# Patient Record
Sex: MALE | Race: WHITE | NOT HISPANIC OR LATINO | Employment: OTHER | URBAN - METROPOLITAN AREA
[De-identification: names, ages, dates, MRNs, and addresses within clinical notes are randomized per-mention and may not be internally consistent; named-entity substitution may affect disease eponyms.]

---

## 2017-01-12 ENCOUNTER — PATIENT MESSAGE (OUTPATIENT)
Dept: ORTHOPEDICS | Facility: CLINIC | Age: 72
End: 2017-01-12

## 2017-02-16 ENCOUNTER — PATIENT MESSAGE (OUTPATIENT)
Dept: ORTHOPEDICS | Facility: CLINIC | Age: 72
End: 2017-02-16

## 2017-02-19 RX ORDER — CEFADROXIL 500 MG/1
CAPSULE ORAL
Qty: 5 CAPSULE | Refills: 0 | Status: SHIPPED | OUTPATIENT
Start: 2017-02-19 | End: 2017-08-20 | Stop reason: SDUPTHER

## 2017-04-19 ENCOUNTER — PATIENT MESSAGE (OUTPATIENT)
Dept: ORTHOPEDICS | Facility: CLINIC | Age: 72
End: 2017-04-19

## 2017-05-02 ENCOUNTER — OFFICE VISIT (OUTPATIENT)
Dept: ORTHOPEDICS | Facility: CLINIC | Age: 72
End: 2017-05-02
Payer: MEDICARE

## 2017-05-02 VITALS
DIASTOLIC BLOOD PRESSURE: 87 MMHG | BODY MASS INDEX: 24.03 KG/M2 | HEART RATE: 65 BPM | WEIGHT: 162.25 LBS | HEIGHT: 69 IN | SYSTOLIC BLOOD PRESSURE: 139 MMHG

## 2017-05-02 DIAGNOSIS — M71.9 DISORDERS OF BURSAE AND TENDONS IN SHOULDER REGION, UNSPECIFIED: Primary | ICD-10-CM

## 2017-05-02 DIAGNOSIS — M75.42 SHOULDER IMPINGEMENT SYNDROME, LEFT: ICD-10-CM

## 2017-05-02 DIAGNOSIS — M75.41 SHOULDER IMPINGEMENT SYNDROME, RIGHT: ICD-10-CM

## 2017-05-02 DIAGNOSIS — M67.919 DISORDERS OF BURSAE AND TENDONS IN SHOULDER REGION, UNSPECIFIED: Primary | ICD-10-CM

## 2017-05-02 PROCEDURE — 99213 OFFICE O/P EST LOW 20 MIN: CPT | Mod: PBBFAC,25 | Performed by: PHYSICIAN ASSISTANT

## 2017-05-02 PROCEDURE — 20610 DRAIN/INJ JOINT/BURSA W/O US: CPT | Mod: 50,PBBFAC | Performed by: PHYSICIAN ASSISTANT

## 2017-05-02 PROCEDURE — 96372 THER/PROPH/DIAG INJ SC/IM: CPT | Mod: PBBFAC | Performed by: PHYSICIAN ASSISTANT

## 2017-05-02 PROCEDURE — 99214 OFFICE O/P EST MOD 30 MIN: CPT | Mod: 25,S$PBB,, | Performed by: PHYSICIAN ASSISTANT

## 2017-05-02 PROCEDURE — 99999 PR PBB SHADOW E&M-EST. PATIENT-LVL III: CPT | Mod: PBBFAC,,, | Performed by: PHYSICIAN ASSISTANT

## 2017-05-02 PROCEDURE — 20610 DRAIN/INJ JOINT/BURSA W/O US: CPT | Mod: 50,S$PBB,, | Performed by: PHYSICIAN ASSISTANT

## 2017-05-02 RX ORDER — METHYLPREDNISOLONE ACETATE 80 MG/ML
40 INJECTION, SUSPENSION INTRA-ARTICULAR; INTRALESIONAL; INTRAMUSCULAR; SOFT TISSUE
Status: COMPLETED | OUTPATIENT
Start: 2017-05-02 | End: 2017-05-02

## 2017-05-02 RX ORDER — PIROXICAM 20 MG/1
CAPSULE ORAL
Refills: 3 | COMMUNITY
Start: 2017-02-07 | End: 2017-09-07 | Stop reason: ALTCHOICE

## 2017-05-02 RX ADMIN — METHYLPREDNISOLONE ACETATE 40 MG: 80 INJECTION, SUSPENSION INTRALESIONAL; INTRAMUSCULAR; INTRASYNOVIAL; SOFT TISSUE at 10:05

## 2017-05-02 RX ADMIN — METHYLPREDNISOLONE ACETATE 40 MG: 80 INJECTION, SUSPENSION INTRA-ARTICULAR; INTRALESIONAL; INTRAMUSCULAR; SOFT TISSUE at 10:05

## 2017-05-02 NOTE — PROGRESS NOTES
Subjective:      Patient ID: Trip Gomes is a 71 y.o. male.    Chief Complaint: Pain of the Left Shoulder and Pain of the Right Shoulder    HPI Comments: Body part: Bilat Shoulders    Occupation: retired     Dominant hand: R    Referred by: No ref. provider found    Date of Injury: None    Patient's visit goal: Injection to shoulders    Problem Description: Pt is known to the dept, new to me for bilat shoulder pain. He has had bilat MRI and has degenerative changes. Has had several months of relief with prior injection into each shoulder. He is active and works out several days/ week. He has no weakness, cervicalgia, or skin sen disturbance. He has night pain that is positional. He does have relief with nsaids if taken. Would like repeat injection.           Pain   This is a chronic problem. The current episode started more than 1 year ago. The problem occurs daily. The problem has been unchanged. Exacerbated by: lying on one side at night. He has tried heat, ice and NSAIDs for the symptoms. The treatment provided mild relief.       Review of Systems   Musculoskeletal: Positive for arthritis and joint pain.         Objective:            General    Nursing note and vitals reviewed.  Constitutional: He is oriented to person, place, and time. He appears well-developed and well-nourished. No distress.   Neurological: He is alert and oriented to person, place, and time.   Psychiatric: He has a normal mood and affect. His behavior is normal. Judgment and thought content normal.         Back (L-Spine & T-Spine) / Neck (C-Spine) Exam     Tenderness   The patient is tender to palpation of the right trapezial and left trapezial.   Right Shoulder Exam     Inspection/Observation   Swelling: absent  Bruising: absent  Scars: absent    Range of Motion   Active Abduction: normal   Forward Elevation: normal  Adduction: normal  External Rotation 90 degrees: normal  Internal Rotation 90 degrees: normal     Tests  & Signs   Impingement: positive    Other   Sensation: normal    Comments:  Bilat prominent AC joint    Left Shoulder Exam     Inspection/Observation   Swelling: absent  Bruising: absent  Scars: absent    Range of Motion   Active Abduction: normal   Forward Elevation: normal  Adduction: normal  External Rotation 90 degrees: normal  Internal Rotation 90 degrees: normal     Tests & Signs   Impingement: positive    Other   Sensation: normal       Muscle Strength   Right Upper Extremity   Shoulder Abduction: 5/5   Shoulder Internal Rotation: 5/5   Shoulder External Rotation: 5/5 (-)   Left Upper Extremity  Shoulder Abduction: 5/5   Shoulder Internal Rotation: 5/5   Shoulder External Rotation: 5/5 (-)     Reflexes     Left Side  Left Hayes's Sign:  Absent    Right Side   Right Hayes's Sign:  absent    Vascular Exam       Capillary Refill  Right Hand: normal capillary refill  Left Hand: normal capillary refill              Assessment:       Encounter Diagnoses   Name Primary?    Disorders of bursae and tendons in shoulder region, unspecified Yes    Shoulder impingement syndrome, left     Shoulder impingement syndrome, right           Plan:       Trip was seen today for pain and pain.    Diagnoses and all orders for this visit:    Disorders of bursae and tendons in shoulder region, unspecified  -     methylPREDNISolone acetate injection 40 mg; Inject 0.5 mLs (40 mg total) into the articular space one time.  -     methylPREDNISolone acetate injection 40 mg; Inject 0.5 mLs (40 mg total) into the articular space one time.    Shoulder impingement syndrome, left  -     methylPREDNISolone acetate injection 40 mg; Inject 0.5 mLs (40 mg total) into the articular space one time.  -     methylPREDNISolone acetate injection 40 mg; Inject 0.5 mLs (40 mg total) into the articular space one time.    Shoulder impingement syndrome, right  -     methylPREDNISolone acetate injection 40 mg; Inject 0.5 mLs (40 mg total) into the  articular space one time.  -     methylPREDNISolone acetate injection 40 mg; Inject 0.5 mLs (40 mg total) into the articular space one time.    Proceed with bilat shoulder injection as case appears to be more bursal with night pain. He is quite functional. Discussed receiving as few injections as possible. He understands the recommendations.     Discussed conservative vs surgical care.   Agrees to move forward with shoulder injection. Discussed skin breakdown, infection, and bleaching. Still agrees to move forward. Pt will return if condition returns.     Inspection of the R shoulder showed normal alignment with intact skin and no visible lesions. The subacromial space was marked and cleansed 3 circular swabs of iodine scrub followed by a swipe of alcohol pad. Using a 27g needle, 2cc 1% xylocaine and 0.5cc 80mg depo-medol  was injected into the subacromial space without complication. Needle was removed and hemostasis was achieved. Patient reported immediate relief. Same procedure repeated on the Left.     The patient understands, chooses and consents to this plan and accepts all   the risks which include but are not limited to the risks mentioned above.   Pt understands the alternative of having no testing, intervention or       treatment at this time. Pt left content and without questions.     Disclaimer: This note was prepared using a voice recognition system and is likely to have sound alike errors within the text.

## 2017-08-17 ENCOUNTER — PATIENT MESSAGE (OUTPATIENT)
Dept: ORTHOPEDICS | Facility: CLINIC | Age: 72
End: 2017-08-17

## 2017-08-20 RX ORDER — CEFADROXIL 500 MG/1
CAPSULE ORAL
Qty: 5 CAPSULE | Refills: 0 | Status: SHIPPED | OUTPATIENT
Start: 2017-08-20 | End: 2017-11-22 | Stop reason: SDUPTHER

## 2017-08-21 NOTE — TELEPHONE ENCOUNTER
Please let the patient know that their medication refill was e- scribed in.    Thank you,    Dr. Triplett

## 2017-09-07 ENCOUNTER — OFFICE VISIT (OUTPATIENT)
Dept: INTERNAL MEDICINE | Facility: CLINIC | Age: 72
End: 2017-09-07
Payer: MEDICARE

## 2017-09-07 ENCOUNTER — HOSPITAL ENCOUNTER (OUTPATIENT)
Dept: RADIOLOGY | Facility: HOSPITAL | Age: 72
Discharge: HOME | End: 2017-09-07
Attending: FAMILY MEDICINE
Payer: MEDICARE

## 2017-09-07 VITALS
HEIGHT: 69 IN | DIASTOLIC BLOOD PRESSURE: 82 MMHG | SYSTOLIC BLOOD PRESSURE: 134 MMHG | WEIGHT: 162.94 LBS | HEART RATE: 81 BPM | BODY MASS INDEX: 24.13 KG/M2 | TEMPERATURE: 97 F

## 2017-09-07 DIAGNOSIS — I10 ESSENTIAL HYPERTENSION: ICD-10-CM

## 2017-09-07 DIAGNOSIS — I10 ESSENTIAL HYPERTENSION: Primary | ICD-10-CM

## 2017-09-07 PROCEDURE — 1157F ADVNC CARE PLAN IN RCRD: CPT | Mod: ,,, | Performed by: FAMILY MEDICINE

## 2017-09-07 PROCEDURE — 1159F MED LIST DOCD IN RCRD: CPT | Mod: ,,, | Performed by: FAMILY MEDICINE

## 2017-09-07 PROCEDURE — 99213 OFFICE O/P EST LOW 20 MIN: CPT | Mod: S$PBB,,, | Performed by: FAMILY MEDICINE

## 2017-09-07 PROCEDURE — 3075F SYST BP GE 130 - 139MM HG: CPT | Mod: ,,, | Performed by: FAMILY MEDICINE

## 2017-09-07 PROCEDURE — 99999 PR PBB SHADOW E&M-EST. PATIENT-LVL III: CPT | Mod: PBBFAC,,, | Performed by: FAMILY MEDICINE

## 2017-09-07 PROCEDURE — 93880 EXTRACRANIAL BILAT STUDY: CPT | Mod: TC,PO

## 2017-09-07 PROCEDURE — 3079F DIAST BP 80-89 MM HG: CPT | Mod: ,,, | Performed by: FAMILY MEDICINE

## 2017-09-07 PROCEDURE — 93880 EXTRACRANIAL BILAT STUDY: CPT | Mod: 26,,, | Performed by: RADIOLOGY

## 2017-09-07 PROCEDURE — 99213 OFFICE O/P EST LOW 20 MIN: CPT | Mod: PBBFAC,25,PO | Performed by: FAMILY MEDICINE

## 2017-09-07 PROCEDURE — 1126F AMNT PAIN NOTED NONE PRSNT: CPT | Mod: ,,, | Performed by: FAMILY MEDICINE

## 2017-09-07 NOTE — PROGRESS NOTES
Subjective:       Patient ID: Trip Gomes is a 72 y.o. male.    Chief Complaint: Ear Fullness    Ear Fullness    There is pain in the left ear. This is a new problem. The current episode started more than 1 month ago. The problem occurs constantly. The problem has been unchanged. There has been no fever. The pain is at a severity of 0/10. The patient is experiencing no pain. Pertinent negatives include no coughing, diarrhea, ear discharge, headaches, hearing loss, neck pain, rhinorrhea or vomiting. Associated symptoms comments: Pulsating ear when stand-up. He has tried nothing for the symptoms. The treatment provided no relief. There is no history of hearing loss.     Review of Systems   Constitutional: Negative for activity change and unexpected weight change.   HENT: Negative for ear discharge, ear pain, hearing loss, rhinorrhea and trouble swallowing.    Eyes: Negative for discharge and visual disturbance.   Respiratory: Negative for cough, chest tightness and wheezing.    Cardiovascular: Negative for chest pain and palpitations.   Gastrointestinal: Negative for blood in stool, constipation, diarrhea and vomiting.   Endocrine: Negative for polydipsia and polyuria.   Genitourinary: Negative for difficulty urinating, hematuria and urgency.   Musculoskeletal: Negative for arthralgias, joint swelling and neck pain.   Neurological: Negative for weakness and headaches.   Psychiatric/Behavioral: Negative for confusion and dysphoric mood.       Objective:      Physical Exam   Constitutional: He is oriented to person, place, and time. He appears well-developed and well-nourished.   HENT:   Right Ear: Tympanic membrane normal.   Left Ear: Tympanic membrane normal.   Neck: Normal range of motion. Neck supple.   Cardiovascular: Normal rate and regular rhythm.    Pulmonary/Chest: Effort normal and breath sounds normal.   Abdominal: Soft. Bowel sounds are normal.   Neurological: He is alert and oriented to person, place,  and time.       Assessment:       1. Essential hypertension        Plan:       Essential hypertension  Comments:  Will do U/S carotid  Orders:  -     US Carotid Bilateral; Future; Expected date: 09/07/2017

## 2017-11-16 ENCOUNTER — PATIENT MESSAGE (OUTPATIENT)
Dept: ORTHOPEDICS | Facility: CLINIC | Age: 72
End: 2017-11-16

## 2017-11-22 ENCOUNTER — TELEPHONE (OUTPATIENT)
Dept: ORTHOPEDICS | Facility: CLINIC | Age: 72
End: 2017-11-22

## 2017-11-22 RX ORDER — CEFADROXIL 500 MG/1
CAPSULE ORAL
Qty: 5 CAPSULE | Refills: 3 | Status: SHIPPED | OUTPATIENT
Start: 2017-11-22 | End: 2017-12-07 | Stop reason: ALTCHOICE

## 2017-11-22 NOTE — TELEPHONE ENCOUNTER
Called pt to advise of Rx being sent to pharmacy. Advised pt to Take 1 pill the evening before dental procedure. Then take twice a day until gone. Pt vocalized understanding.

## 2017-11-27 ENCOUNTER — PATIENT OUTREACH (OUTPATIENT)
Dept: ADMINISTRATIVE | Facility: HOSPITAL | Age: 72
End: 2017-11-27

## 2017-12-07 ENCOUNTER — LAB VISIT (OUTPATIENT)
Dept: LAB | Facility: HOSPITAL | Age: 72
End: 2017-12-07
Attending: FAMILY MEDICINE
Payer: MEDICARE

## 2017-12-07 ENCOUNTER — OFFICE VISIT (OUTPATIENT)
Dept: INTERNAL MEDICINE | Facility: CLINIC | Age: 72
End: 2017-12-07
Payer: MEDICARE

## 2017-12-07 VITALS
WEIGHT: 162.94 LBS | DIASTOLIC BLOOD PRESSURE: 86 MMHG | HEIGHT: 69 IN | SYSTOLIC BLOOD PRESSURE: 144 MMHG | BODY MASS INDEX: 24.13 KG/M2 | TEMPERATURE: 96 F | HEART RATE: 69 BPM

## 2017-12-07 DIAGNOSIS — H93.8X9 SENSATION OF FULLNESS IN EAR, UNSPECIFIED LATERALITY: ICD-10-CM

## 2017-12-07 DIAGNOSIS — E78.00 HYPERCHOLESTEREMIA: ICD-10-CM

## 2017-12-07 DIAGNOSIS — Z23 IMMUNIZATION DUE: Primary | ICD-10-CM

## 2017-12-07 LAB
CHOLEST SERPL-MCNC: 257 MG/DL
CHOLEST/HDLC SERPL: 2.9 {RATIO}
HDLC SERPL-MCNC: 90 MG/DL
HDLC SERPL: 35 %
LDLC SERPL CALC-MCNC: 156.8 MG/DL
NONHDLC SERPL-MCNC: 167 MG/DL
TRIGL SERPL-MCNC: 51 MG/DL

## 2017-12-07 PROCEDURE — 99213 OFFICE O/P EST LOW 20 MIN: CPT | Mod: PBBFAC,PO | Performed by: FAMILY MEDICINE

## 2017-12-07 PROCEDURE — 99999 PR PBB SHADOW E&M-EST. PATIENT-LVL III: CPT | Mod: PBBFAC,,, | Performed by: FAMILY MEDICINE

## 2017-12-07 PROCEDURE — G0009 ADMIN PNEUMOCOCCAL VACCINE: HCPCS | Mod: PBBFAC,PO

## 2017-12-07 PROCEDURE — 90732 PPSV23 VACC 2 YRS+ SUBQ/IM: CPT | Mod: PBBFAC,PO

## 2017-12-07 PROCEDURE — 80061 LIPID PANEL: CPT

## 2017-12-07 PROCEDURE — 36415 COLL VENOUS BLD VENIPUNCTURE: CPT | Mod: PO

## 2017-12-07 PROCEDURE — 99213 OFFICE O/P EST LOW 20 MIN: CPT | Mod: S$PBB,,, | Performed by: FAMILY MEDICINE

## 2017-12-07 NOTE — PROGRESS NOTES
Subjective:       Patient ID: Trip Gomes is a 72 y.o. male.    Chief Complaint: Follow-up    F/U:       Pt is a 72 year how has no real on going medical issues.      Review of Systems   Constitutional: Negative for activity change and unexpected weight change.   HENT: Negative for hearing loss, rhinorrhea and trouble swallowing.    Eyes: Negative for discharge and visual disturbance.   Respiratory: Negative for chest tightness and wheezing.    Cardiovascular: Negative for chest pain and palpitations.   Gastrointestinal: Negative for blood in stool, constipation, diarrhea and vomiting.   Endocrine: Negative for polydipsia and polyuria.   Genitourinary: Negative for difficulty urinating, hematuria and urgency.   Musculoskeletal: Negative for arthralgias, joint swelling and neck pain.   Neurological: Negative for weakness and headaches.   Psychiatric/Behavioral: Negative for confusion and dysphoric mood.       Objective:      Physical Exam   Constitutional: He is oriented to person, place, and time. He appears well-developed and well-nourished.   Neck: Normal range of motion. Neck supple.   Cardiovascular: Normal rate and regular rhythm.    Pulmonary/Chest: Effort normal and breath sounds normal. He has no wheezes. He has no rales.   Abdominal: Soft. Bowel sounds are normal.   Neurological: He is alert and oriented to person, place, and time. He exhibits normal muscle tone.   Skin: Skin is warm and dry.       Assessment:       1. Immunization due    2. Sensation of fullness in ear, unspecified laterality    3. Hypercholesteremia        Plan:       Immunization due  -     (In Office Administered) Pneumococcal Polysaccharide Vaccine (23 Valent) (SQ/IM)    Sensation of fullness in ear, unspecified laterality    Hypercholesteremia  -     Lipid panel; Future; Expected date: 12/07/2017

## 2017-12-18 ENCOUNTER — PATIENT MESSAGE (OUTPATIENT)
Dept: INTERNAL MEDICINE | Facility: CLINIC | Age: 72
End: 2017-12-18

## 2017-12-19 RX ORDER — OSELTAMIVIR PHOSPHATE 75 MG/1
75 CAPSULE ORAL DAILY
Qty: 10 CAPSULE | Refills: 0 | Status: SHIPPED | OUTPATIENT
Start: 2017-12-19 | End: 2017-12-29

## 2018-04-05 ENCOUNTER — PATIENT MESSAGE (OUTPATIENT)
Dept: INTERNAL MEDICINE | Facility: CLINIC | Age: 73
End: 2018-04-05

## 2018-04-09 ENCOUNTER — LAB VISIT (OUTPATIENT)
Dept: LAB | Facility: HOSPITAL | Age: 73
End: 2018-04-09
Attending: OTOLARYNGOLOGY
Payer: MEDICARE

## 2018-04-09 ENCOUNTER — CLINICAL SUPPORT (OUTPATIENT)
Dept: AUDIOLOGY | Facility: CLINIC | Age: 73
End: 2018-04-09
Payer: MEDICARE

## 2018-04-09 ENCOUNTER — OFFICE VISIT (OUTPATIENT)
Dept: OTOLARYNGOLOGY | Facility: CLINIC | Age: 73
End: 2018-04-09
Payer: MEDICARE

## 2018-04-09 VITALS
DIASTOLIC BLOOD PRESSURE: 89 MMHG | WEIGHT: 162 LBS | SYSTOLIC BLOOD PRESSURE: 158 MMHG | TEMPERATURE: 97 F | HEART RATE: 68 BPM | HEIGHT: 69 IN | BODY MASS INDEX: 23.99 KG/M2

## 2018-04-09 DIAGNOSIS — H93.A9 PULSATILE TINNITUS: ICD-10-CM

## 2018-04-09 DIAGNOSIS — R22.1 NECK MASS: Primary | ICD-10-CM

## 2018-04-09 DIAGNOSIS — H93.A3 PULSATILE TINNITUS OF BOTH EARS: ICD-10-CM

## 2018-04-09 LAB
CREAT SERPL-MCNC: 1 MG/DL
EST. GFR  (AFRICAN AMERICAN): >60 ML/MIN/1.73 M^2
EST. GFR  (NON AFRICAN AMERICAN): >60 ML/MIN/1.73 M^2

## 2018-04-09 PROCEDURE — 92567 TYMPANOMETRY: CPT | Mod: PBBFAC,PO | Performed by: AUDIOLOGIST

## 2018-04-09 PROCEDURE — 36415 COLL VENOUS BLD VENIPUNCTURE: CPT | Mod: PO

## 2018-04-09 PROCEDURE — 82565 ASSAY OF CREATININE: CPT

## 2018-04-09 PROCEDURE — 99213 OFFICE O/P EST LOW 20 MIN: CPT | Mod: PBBFAC,PO,25 | Performed by: OTOLARYNGOLOGY

## 2018-04-09 PROCEDURE — 92557 COMPREHENSIVE HEARING TEST: CPT | Mod: PBBFAC,PO | Performed by: AUDIOLOGIST

## 2018-04-09 PROCEDURE — 99999 PR PBB SHADOW E&M-EST. PATIENT-LVL III: CPT | Mod: PBBFAC,,, | Performed by: OTOLARYNGOLOGY

## 2018-04-09 PROCEDURE — 99214 OFFICE O/P EST MOD 30 MIN: CPT | Mod: S$PBB,,, | Performed by: OTOLARYNGOLOGY

## 2018-04-09 NOTE — PROGRESS NOTES
Subjective:   Patient: Trip Gomes [id number], :1945   Visit date:2018 9:17 AM    Chief Complaint:  pulsatile tinnitus    HPI:  Trip is a 72 y.o. male who is here for pulsatile tinnitus.    Tinnitus:    Patient presents with tinnitus. Onset of symptoms was abrupt starting 1 year ago ago with unchanged course since that time. Patient describes the tinnitus as recurrent located in the bilateral ear. The quality is described as medium range pitch. The pattern is pulsatile with an intensity that is medium. Patient describes his level of annoyance as minimally annoying, intermittently aware. Associated symptoms include hearing loss Previous treatments include none.  Symptoms occur when going from sitting/lying to standing.  They last for about 1 minute then subside.     Neck mass:  Left sided neck mass.  Present for several years.  Intermittently uncomfortable.  Possibly growing slowly.  No prior imaging or biopsy.                 Review of Systems:  -     Allergic/Immunologic: has No Known Allergies..  -     Constitutional: Current temp: 97.2 °F (36.2 °C) (Tympanic)    His meds, allergies, medical, surgical, social & family histories were reviewed & updated:  -     He has a current medication list which includes the following prescription(s): acetaminophen, hydrocortisone, and multivitamin, and the following Facility-Administered Medications: methylprednisolone acetate and methylprednisolone acetate.  -     He  has a past medical history of Cervical disc displacement and Melanoma of left upper arm ().   -     He  does not have any pertinent problems on file.   -     He  has a past surgical history that includes Foot surgery; Excision basal cell carcinoma; Knee arthroscopy w/ debridement; Melanoma; Total hip arthroplasty (Right, 11/10/2015); and Joint replacement (Right).  -     He  reports that he has never smoked. He has never used smokeless tobacco. He reports that he drinks about 1.2 oz of alcohol  "per week . He reports that he does not use drugs.  -     His family history includes Kidney cancer in his father; Stroke (age of onset: 67) in his mother.  -     He has No Known Allergies.    Objective:     Physical Exam:  Vitals:  BP (!) 158/89   Pulse 68   Temp 97.2 °F (36.2 °C) (Tympanic)   Ht 5' 9" (1.753 m)   Wt 73.5 kg (162 lb)   BMI 23.92 kg/m²   Appearance:  Well-developed, well-nourished.  Communication:  Able to communicate, no hoarseness.  Head & Face:  Normocephalic, atraumatic, no sinus tenderness, normal facial strength.  Eyes:  Extraocular motions intact.  Ears:  Otoscopy of external auditory canals and tympanic membranes was normal, clinical speech reception thresholds grossly intact, no mass/lesion of auricle.  Nose:  No masses/lesions of external nose, nasal mucosa, septum, and turbinates were within normal limits.  Mouth:  No mass/lesion of lips, teeth, gums, hard/soft palate, tongue, tonsils, or oropharynx.  Neck & Lymphatics:  No cervical lymphadenopathy, no neck crepitus/ asymmetry, trachea is midline, no thyroid enlargement/tenderness/mass.  Left sided soft tissue mass 2.5cm, soft mobile, normal overlying skin.   Neuro/Psych: Alert with normal mood and affect.   Abdominal: Normal appearance.   Respiration/Chest:  Symmetric expansion during respiration, normal respiratory effort.  Skin:  Warm and intact  Cardiovascular:  No peripheral vascular edema or varicosities.    Assessment & Plan:   Trip was seen today for pulsatile tinnitus.    Diagnoses and all orders for this visit:    Neck mass    Pulsatile tinnitus  -     Creatinine, serum; Future  -     CTA Head; Future      Deciding on the initial radiographic evaluation in patients with PT can be challenging due to the many causes as well as the questionable results of some of the imaging findings. Recent studies have shown an increase in the cases of SSDD, which is best visualized on a CT scan. In addition, sensitivity and specificity " analysis have shown that CTA may be the best initial test in patients with unilateral subjective PT. For patients with objective PT with no middle ear mass, a CTA is the best initial exam. For those others with subjective unilateral PT, it is important to distinguish between venous and arterial PT. For patients with signs and symptoms of IIH, MRI/MRV is the appropriate initial study. And for the remaining cases of venous and arterial PT, consider CTA as the best initial study due to safety and broad effectiveness (Laryngoscope. 2015;125:284-285).    Recommend excisional biopsy of left neck mass vs observation.  He would like to think about this.

## 2018-04-09 NOTE — PROGRESS NOTES
Trip Gomes was seen 04/09/2018 for an audiological evaluation.  Patient complains of bilateral pulsatile tinnitus for the past year.  He reports a recent episode of hives, and confirms that he suffers from seasonal allergies.  He reports a history of noise exposure including  service and power tools.  He has a confirmed hearing loss with asymmetry in 2014 (Left ear worse.) MRI was normal at that time.    Results reveal a mild-to-moderately severe sensorineural hearing loss 250-8000 Hz for the right ear, and  mild-to-severe sensorineural hearing loss 250-8000 Hz for the left ear.   Speech Reception Thresholds were  10 dBHL for the right ear and 10 dBHL for the left ear.   Word recognition scores were excellent for the right ear and excellent for the left ear.   Tympanograms were Type A for the right ear and Type A for the left ear.    Patient was counseled on the above findings.    REC:  ENT consult  Annual audiogram

## 2018-04-11 ENCOUNTER — TELEPHONE (OUTPATIENT)
Dept: RADIOLOGY | Facility: HOSPITAL | Age: 73
End: 2018-04-11

## 2018-04-12 ENCOUNTER — HOSPITAL ENCOUNTER (OUTPATIENT)
Dept: RADIOLOGY | Facility: HOSPITAL | Age: 73
Discharge: HOME | End: 2018-04-12
Attending: OTOLARYNGOLOGY
Payer: MEDICARE

## 2018-04-12 DIAGNOSIS — H93.A9 PULSATILE TINNITUS: ICD-10-CM

## 2018-04-12 PROCEDURE — 25500020 PHARM REV CODE 255: Mod: PO | Performed by: OTOLARYNGOLOGY

## 2018-04-12 PROCEDURE — 70496 CT ANGIOGRAPHY HEAD: CPT | Mod: 26,,, | Performed by: RADIOLOGY

## 2018-04-12 PROCEDURE — 70496 CT ANGIOGRAPHY HEAD: CPT | Mod: TC,PO

## 2018-04-12 RX ADMIN — IOHEXOL 100 ML: 350 INJECTION, SOLUTION INTRAVENOUS at 09:04

## 2018-04-17 ENCOUNTER — PATIENT MESSAGE (OUTPATIENT)
Dept: OTOLARYNGOLOGY | Facility: CLINIC | Age: 73
End: 2018-04-17

## 2018-04-18 ENCOUNTER — PATIENT MESSAGE (OUTPATIENT)
Dept: OTOLARYNGOLOGY | Facility: CLINIC | Age: 73
End: 2018-04-18

## 2018-04-18 DIAGNOSIS — I65.09 VERTEBRAL ARTERY STENOSIS, UNSPECIFIED LATERALITY: Primary | ICD-10-CM

## 2018-04-23 ENCOUNTER — PATIENT MESSAGE (OUTPATIENT)
Dept: OTOLARYNGOLOGY | Facility: CLINIC | Age: 73
End: 2018-04-23

## 2018-04-24 ENCOUNTER — OFFICE VISIT (OUTPATIENT)
Dept: CARDIOLOGY | Facility: CLINIC | Age: 73
End: 2018-04-24
Payer: MEDICARE

## 2018-04-24 VITALS
DIASTOLIC BLOOD PRESSURE: 84 MMHG | SYSTOLIC BLOOD PRESSURE: 148 MMHG | WEIGHT: 168 LBS | HEIGHT: 69 IN | HEART RATE: 55 BPM | BODY MASS INDEX: 24.88 KG/M2

## 2018-04-24 DIAGNOSIS — I10 ESSENTIAL HYPERTENSION: ICD-10-CM

## 2018-04-24 DIAGNOSIS — H93.8X9 SENSATION OF FULLNESS IN EAR, UNSPECIFIED LATERALITY: Primary | ICD-10-CM

## 2018-04-24 DIAGNOSIS — E78.00 HYPERCHOLESTEREMIA: ICD-10-CM

## 2018-04-24 PROCEDURE — 93005 ELECTROCARDIOGRAM TRACING: CPT | Mod: PBBFAC,PO | Performed by: INTERNAL MEDICINE

## 2018-04-24 PROCEDURE — 99999 PR PBB SHADOW E&M-EST. PATIENT-LVL III: CPT | Mod: PBBFAC,,, | Performed by: INTERNAL MEDICINE

## 2018-04-24 PROCEDURE — 99213 OFFICE O/P EST LOW 20 MIN: CPT | Mod: PBBFAC,PO | Performed by: INTERNAL MEDICINE

## 2018-04-24 PROCEDURE — 93010 ELECTROCARDIOGRAM REPORT: CPT | Mod: S$PBB,,, | Performed by: INTERNAL MEDICINE

## 2018-04-24 PROCEDURE — 99203 OFFICE O/P NEW LOW 30 MIN: CPT | Mod: S$PBB,,, | Performed by: INTERNAL MEDICINE

## 2018-04-24 NOTE — PROGRESS NOTES
Subjective:   Patient ID:  Trip Gomes is a 72 y.o. male who presents for evaluation of Establish Care (hears his pulse in his ears.  Not dizzy)      71 yo male, referred by PCP. Physically active.  PMH HTN, HLD and s/p hip replacement.  C/o hearing pulse when standing up quickly. No dizziness, chest pain, dyspnea, faint.   Pt states that check ed BP at home, always been controlled.  EKG NSR and LVH.        Past Medical History:   Diagnosis Date    Cervical disc displacement     Melanoma of left upper arm 2012    Dr. Wyatt       Past Surgical History:   Procedure Laterality Date    BASAL CELL CARCINOMA EXCISION      Right anterior tibia    FOOT SURGERY      Right 1st toe joint replacement    JOINT REPLACEMENT Right     R Hip, R big toe    KNEE ARTHROSCOPY W/ DEBRIDEMENT      Bilateral    Melanoma      Left upper arm    TOTAL HIP ARTHROPLASTY Right 11/10/2015       Social History   Substance Use Topics    Smoking status: Never Smoker    Smokeless tobacco: Never Used    Alcohol use 1.2 oz/week     2 Cans of beer per week      Comment: Daily  No alcohol 72h prior to surgery       Family History   Problem Relation Age of Onset    Stroke Mother 67    Kidney cancer Father        Review of Systems   Constitution: Negative for decreased appetite, diaphoresis, fever, weakness, malaise/fatigue and night sweats.   HENT: Negative for nosebleeds.    Eyes: Negative for blurred vision and double vision.   Cardiovascular: Negative for chest pain, claudication, dyspnea on exertion, irregular heartbeat, leg swelling, near-syncope, orthopnea, palpitations, paroxysmal nocturnal dyspnea and syncope.   Respiratory: Negative for cough, shortness of breath, sleep disturbances due to breathing, snoring, sputum production and wheezing.    Endocrine: Negative for cold intolerance and polyuria.   Hematologic/Lymphatic: Does not bruise/bleed easily.   Skin: Negative for rash.   Musculoskeletal: Negative for back pain, falls,  joint pain, joint swelling and neck pain.   Gastrointestinal: Negative for abdominal pain, heartburn, nausea and vomiting.   Genitourinary: Negative for dysuria, frequency and hematuria.   Neurological: Negative for difficulty with concentration, dizziness, focal weakness, headaches, light-headedness, numbness and seizures.   Psychiatric/Behavioral: Negative for depression, memory loss and substance abuse. The patient does not have insomnia.    Allergic/Immunologic: Negative for HIV exposure and hives.       Objective:   Physical Exam   Constitutional: He is oriented to person, place, and time. He appears well-nourished.   HENT:   Head: Normocephalic.   Eyes: Pupils are equal, round, and reactive to light.   Neck: Normal carotid pulses and no JVD present. Carotid bruit is not present. No thyromegaly present.   Cardiovascular: Normal rate, regular rhythm, normal heart sounds and normal pulses.   No extrasystoles are present. PMI is not displaced.  Exam reveals no gallop and no S3.    No murmur heard.  Pulmonary/Chest: Breath sounds normal. No stridor. No respiratory distress.   Abdominal: Soft. Bowel sounds are normal. There is no tenderness. There is no rebound.   Musculoskeletal: Normal range of motion.   Neurological: He is alert and oriented to person, place, and time.   Skin: Skin is intact. No rash noted.   Psychiatric: His behavior is normal.       Lab Results   Component Value Date    CHOL 257 (H) 12/07/2017    CHOL 233 (H) 11/17/2016    CHOL 244 (H) 12/26/2014     Lab Results   Component Value Date    HDL 90 (H) 12/07/2017    HDL 79 (H) 11/17/2016    HDL 78 (H) 12/26/2014     Lab Results   Component Value Date    LDLCALC 156.8 12/07/2017    LDLCALC 140.4 11/17/2016    LDLCALC 148.8 12/26/2014     Lab Results   Component Value Date    TRIG 51 12/07/2017    TRIG 68 11/17/2016    TRIG 86 12/26/2014     Lab Results   Component Value Date    CHOLHDL 35.0 12/07/2017    CHOLHDL 33.9 11/17/2016    CHOLHDL 32.0  12/26/2014       Chemistry        Component Value Date/Time     11/17/2016 0955    K 4.2 11/17/2016 0955     11/17/2016 0955    CO2 23 11/17/2016 0955    BUN 20 11/17/2016 0955    CREATININE 1.0 04/09/2018 0932    GLU 96 11/17/2016 0955        Component Value Date/Time    CALCIUM 9.4 11/17/2016 0955    ALKPHOS 68 11/17/2016 0955    AST 26 11/17/2016 0955    ALT 23 11/17/2016 0955    BILITOT 1.0 11/17/2016 0955    ESTGFRAFRICA >60.0 04/09/2018 0932    EGFRNONAA >60.0 04/09/2018 0932          No results found for: TSH  No results found for: INR, PROTIME  Lab Results   Component Value Date    WBC 8.00 10/16/2015    HGB 11.4 (L) 11/10/2015    HCT 33.6 (L) 11/10/2015    MCV 92 10/16/2015     10/16/2015     BNP  @LABRCNTIP(BNP,BNPTRIAGEBLO)@  CrCl cannot be calculated (Patient's most recent lab result is older than the maximum 7 days allowed.).     Assessment:      1. Sensation of fullness in ear, unspecified laterality    2. Essential hypertension    3. Hypercholesteremia        Plan:   ECHO ordered and will call for the result  Discussed DASH  check BP at home and f/u with PCP.

## 2018-04-24 NOTE — LETTER
April 24, 2018      Nabeel Pemberton MD  [address] [place] [address]           [facility]  [address] [place] [address]  Phone: [phone]  Fax: [phone]          Patient: Trip Gomes   MR Number: [id number]   YOB: 1945   Date of Visit: 4/24/2018       Dear Dr. Nabeel Pemberton:    Thank you for referring Trip Gomes to me for evaluation. Attached you will find relevant portions of my assessment and plan of care.    If you have questions, please do not hesitate to call me. I look forward to following Trip Gomes along with you.    Sincerely,    Bonilla Doshi MD    Enclosure  CC:  No Recipients    If you would like to receive this communication electronically, please contact [email or web address] or [phone] to request more information on [employer] access.    For providers and/or their staff who would like to refer a patient to [facility], please contact us through our one-stop-shop provider referral line, Isabel Babb, at [phone].    If you feel you have received this communication in error or would no longer like to receive these types of communications, please e-mail [email or web address]

## 2018-04-24 NOTE — PATIENT INSTRUCTIONS

## 2018-05-02 ENCOUNTER — CLINICAL SUPPORT (OUTPATIENT)
Dept: CARDIOLOGY | Facility: CLINIC | Age: 73
End: 2018-05-02
Attending: INTERNAL MEDICINE
Payer: MEDICARE

## 2018-05-02 ENCOUNTER — TELEPHONE (OUTPATIENT)
Dept: CARDIOLOGY | Facility: CLINIC | Age: 73
End: 2018-05-02

## 2018-05-02 DIAGNOSIS — I10 ESSENTIAL HYPERTENSION: ICD-10-CM

## 2018-05-02 DIAGNOSIS — H93.8X9 SENSATION OF FULLNESS IN EAR, UNSPECIFIED LATERALITY: ICD-10-CM

## 2018-05-02 LAB
DIASTOLIC DYSFUNCTION: YES
ESTIMATED PA SYSTOLIC PRESSURE: 24.4
RETIRED EF AND QEF - SEE NOTES: 60 (ref 55–65)

## 2018-05-02 PROCEDURE — 93306 TTE W/DOPPLER COMPLETE: CPT | Mod: PBBFAC,PO | Performed by: INTERNAL MEDICINE

## 2018-05-02 NOTE — TELEPHONE ENCOUNTER
Spoke with pt with echo results.  Pt verbalized understanding.    ----- Message from Bonilla Doshi MD sent at 5/2/2018  3:12 PM CDT -----  Echo showed normal EF and mild diastolic dysfunction.  Continue current Rx

## 2018-06-08 ENCOUNTER — PATIENT MESSAGE (OUTPATIENT)
Dept: ORTHOPEDICS | Facility: CLINIC | Age: 73
End: 2018-06-08

## 2018-06-08 ENCOUNTER — PATIENT MESSAGE (OUTPATIENT)
Dept: OTOLARYNGOLOGY | Facility: CLINIC | Age: 73
End: 2018-06-08

## 2018-06-11 DIAGNOSIS — M25.552 PAIN OF LEFT HIP JOINT: Primary | ICD-10-CM

## 2018-06-13 ENCOUNTER — PROCEDURE VISIT (OUTPATIENT)
Dept: OTOLARYNGOLOGY | Facility: CLINIC | Age: 73
End: 2018-06-13
Payer: MEDICARE

## 2018-06-13 VITALS
HEIGHT: 69 IN | BODY MASS INDEX: 24.26 KG/M2 | WEIGHT: 163.81 LBS | DIASTOLIC BLOOD PRESSURE: 82 MMHG | SYSTOLIC BLOOD PRESSURE: 152 MMHG | HEART RATE: 70 BPM

## 2018-06-13 DIAGNOSIS — Z98.890 S/P EXCISION OF LIPOMA: Primary | ICD-10-CM

## 2018-06-13 DIAGNOSIS — Z86.018 S/P EXCISION OF LIPOMA: Primary | ICD-10-CM

## 2018-06-13 DIAGNOSIS — R22.1 NECK MASS: ICD-10-CM

## 2018-06-13 PROCEDURE — 88305 TISSUE EXAM BY PATHOLOGIST: CPT | Performed by: PATHOLOGY

## 2018-06-13 PROCEDURE — 11426 EXC H-F-NK-SP B9+MARG >4 CM: CPT | Mod: S$PBB,,, | Performed by: OTOLARYNGOLOGY

## 2018-06-13 PROCEDURE — 11426 EXC H-F-NK-SP B9+MARG >4 CM: CPT | Mod: PBBFAC | Performed by: OTOLARYNGOLOGY

## 2018-06-13 PROCEDURE — 88305 TISSUE EXAM BY PATHOLOGIST: CPT | Mod: 26,,, | Performed by: PATHOLOGY

## 2018-06-13 RX ORDER — MUPIROCIN 20 MG/G
OINTMENT TOPICAL 2 TIMES DAILY
Qty: 22 G | Refills: 3 | Status: SHIPPED | OUTPATIENT
Start: 2018-06-13 | End: 2018-06-23

## 2018-06-13 RX ORDER — ZOSTER VACCINE RECOMBINANT, ADJUVANTED 50 MCG/0.5
KIT INTRAMUSCULAR
COMMUNITY
Start: 2018-06-05 | End: 2018-06-13

## 2018-06-13 RX ORDER — CETIRIZINE HYDROCHLORIDE 10 MG/1
10 TABLET ORAL DAILY
COMMUNITY
End: 2018-11-20

## 2018-06-13 RX ORDER — CEFADROXIL 500 MG/1
CAPSULE ORAL
COMMUNITY
Start: 2018-05-24 | End: 2018-06-13 | Stop reason: ALTCHOICE

## 2018-06-13 NOTE — PROCEDURES
Procedures     Patient: Trip Gomes [id number], :1945  Procedure date:2018  Patient's medications, allergies, past medical, surgical, social and family histories were reviewed and updated as appropriate.  Chief Complaint:  Follow-up (removal Lipoma Left Neck )    HPI:  Trip is a 73 y.o. male with the history of present illness as discussed in the clinic note from today.    Procedure: Risks, benefits, and alternatives of the procedure were discussed with the patient, and the patient consented to the biopsy of the left neck lesion.  The area was visualized with proper lighting and exposure.  Adequate anesthesia was obtained using 1% Lidocaine with 1:100,000 Epinephrine.  The lesion was completely excised using the scapel.  This was circumferentially dissected and the great auricular nerve was dissected free from the mass. The wound did require suture closure 5-0 vicryl deep; 5-0 fast absorbing for skin.  The mass measured 4.5 x 3 cm. The patient tolerated the procedure well with no complications.     Assessment & Plan:  - see today's clinic note

## 2018-06-14 ENCOUNTER — OFFICE VISIT (OUTPATIENT)
Dept: ORTHOPEDICS | Facility: CLINIC | Age: 73
End: 2018-06-14
Payer: MEDICARE

## 2018-06-14 ENCOUNTER — HOSPITAL ENCOUNTER (OUTPATIENT)
Dept: RADIOLOGY | Facility: HOSPITAL | Age: 73
Discharge: HOME | End: 2018-06-14
Attending: ORTHOPAEDIC SURGERY
Payer: MEDICARE

## 2018-06-14 VITALS
RESPIRATION RATE: 12 BRPM | SYSTOLIC BLOOD PRESSURE: 155 MMHG | BODY MASS INDEX: 24.14 KG/M2 | WEIGHT: 163 LBS | HEART RATE: 65 BPM | HEIGHT: 69 IN | DIASTOLIC BLOOD PRESSURE: 89 MMHG

## 2018-06-14 DIAGNOSIS — M25.552 PAIN OF LEFT HIP JOINT: ICD-10-CM

## 2018-06-14 DIAGNOSIS — M70.61 GREATER TROCHANTERIC BURSITIS OF RIGHT HIP: Primary | ICD-10-CM

## 2018-06-14 PROCEDURE — 99214 OFFICE O/P EST MOD 30 MIN: CPT | Mod: S$PBB,,, | Performed by: ORTHOPAEDIC SURGERY

## 2018-06-14 PROCEDURE — 99213 OFFICE O/P EST LOW 20 MIN: CPT | Mod: PBBFAC,25,PO | Performed by: ORTHOPAEDIC SURGERY

## 2018-06-14 PROCEDURE — 73521 X-RAY EXAM HIPS BI 2 VIEWS: CPT | Mod: 26,,, | Performed by: RADIOLOGY

## 2018-06-14 PROCEDURE — 99999 PR PBB SHADOW E&M-EST. PATIENT-LVL III: CPT | Mod: PBBFAC,,, | Performed by: ORTHOPAEDIC SURGERY

## 2018-06-14 PROCEDURE — 73521 X-RAY EXAM HIPS BI 2 VIEWS: CPT | Mod: TC,FY,PO

## 2018-06-14 NOTE — LETTER
Mildred 15, 2018      Meek Triplett MD  [address] [place] [address] [place] [address]           [facility] - [facility]oped[facility]  [address] [facility] [place] [address]  Phone: [phone]  Fax: [phone]          Patient: Trip Gomes   MR Number: [id number]   YOB: 1945   Date of Visit: 6/14/2018       Dear Dr. Meek Triplett:    Thank you for referring Trip Gomes to me for evaluation. Attached you will find relevant portions of my assessment and plan of care.    If you have questions, please do not hesitate to call me. I look forward to following Trip Gomes along with you.    Sincerely,    Foster Summers Sr., MD    Enclosure  CC:  No Recipients    If you would like to receive this communication electronically, please contact [email or web address] or [phone] to request more information on [employer] access.    For providers and/or their staff who would like to refer a patient to [facility], please contact us through our one-stop-shop provider referral line, [facility], at [phone].    If you feel you have received this communication in error or would no longer like to receive these types of communications, please e-mail [email or web address]

## 2018-06-14 NOTE — PROGRESS NOTES
"SUBJECTIVE:  Patient is status post Right total hip replacement, in the past, by Dr. Triplett.  He complains of right-sided hip pain. He indicates that he appears to have a bursitis of the right hip. He indicates that the movement and bending aggravates the pain. The patient indicates that he has been performing exercises lately.  Patient complains of 5 /10 pain that is better with the  pain meds and aggravated with movement.    Past Medical History:   Diagnosis Date    Cervical disc displacement     Melanoma of left upper arm 2012    Dr. Wyatt     Past Surgical History:   Procedure Laterality Date    BASAL CELL CARCINOMA EXCISION      Right anterior tibia    FOOT SURGERY      Right 1st toe joint replacement    JOINT REPLACEMENT Right     R Hip, R big toe    KNEE ARTHROSCOPY W/ DEBRIDEMENT      Bilateral    Melanoma      Left upper arm    TOTAL HIP ARTHROPLASTY Right 11/10/2015     Review of patient's allergies indicates:  No Known Allergies  Current Outpatient Prescriptions on File Prior to Visit   Medication Sig Dispense Refill    cetirizine (ZYRTEC) 10 MG tablet Take 10 mg by mouth once daily.      hydrocortisone 2.5 % cream GUNJAN AA BID PRN  3    multivitamin (ONE DAILY MULTIVITAMIN) per tablet Take 1 tablet by mouth once daily.      mupirocin (BACTROBAN) 2 % ointment Apply topically 2 (two) times daily. Apply to left neck incision twice daily 22 g 3     Current Facility-Administered Medications on File Prior to Visit   Medication Dose Route Frequency Provider Last Rate Last Dose    methylPREDNISolone acetate injection 80 mg  80 mg Intra-articular Once Meek Triplett MD        methylPREDNISolone acetate injection 80 mg  80 mg Intra-articular Once Meek Triplett MD         BP (!) 155/89   Pulse 65   Resp 12   Ht 5' 9" (1.753 m)   Wt 73.9 kg (163 lb)   BMI 24.07 kg/m²    ROS:  GENERAL: No fever, chills, fatigability or weight loss.  SKIN: No rashes, itching or changes in color or texture of " skin.  HEAD: No headaches or recent head trauma.  EYES: Visual acuity fine. No photophobia, ocular pain or diplopia.  EARS: Denies ear pain, discharge or vertigo.  NOSE: No loss of smell, no epistaxis or postnasal drip.  MOUTH & THROAT: No hoarseness or change in voice. No excessive gum bleeding.  NODES: Denies swollen glands.  CHEST: Denies MINER, cyanosis, wheezing, cough and sputum production.  CARDIOVASCULAR: Denies chest pain, PND, orthopnea or reduced exercise tolerance.  ABDOMEN: Appetite fine. No weight loss. Denies diarrhea, abdominal pain, hematemesis or blood in stool.  URINARY: No flank pain, dysuria or hematuria.  PERIPHERAL VASCULAR: No claudication or cyanosis.  NEUROLOGIC: No history of seizures, paralysis, alteration of gait or coordination.  MUSCULOSKELETAL: See HPI    PE:  APPEARANCE: Well nourished, well developed, in no acute distress.   HEAD: Normocephalic, atraumatic.  EYES: PERRL. EOMI.   EARS: TM's intact. Light reflex normal. No retraction or perforation.   NOSE: Mucosa pink. Airway clear.  MOUTH & THROAT: No tonsillar enlargement. No pharyngeal erythema or exudate. No stridor.  NECK: Supple.   NODES: No cervical, axillary or inguinal lymph node enlargement.  CHEST: Lungs clear to auscultation.  CARDIOVASCULAR: Normal S1, S2. No rubs, murmurs or gallops.  ABDOMEN: Bowel sounds normal. Not distended. Soft. No tenderness or masses.  NEUROLOGIC: Cranial Nerves: II-XII grossly intact, also see MUSCULOSKELETAL  MUSCULOSKELETAL:        Right Hip 2 plus dorsalis pedis and posterior tibial artery pulses, light touch intact Right lower extremity.  All digits are warm. No erythema, no warmth, no drainage, no swelling,  mild tenderness over the right greater trochanter and the greater sciatic notch..  Less than 2 seconds capillary refill all digits.      ASSESSMENT:    Diagnosis:  1.  Right greater trochanteric bursitis     The x-ray shows the right hip prosthesis in place. No evidence of  radiolucency.      X-Ray Hips Bilateral 2 View Incl AP Pelvis  Narrative: EXAMINATION:  XR HIPS BILATERAL 2 VIEW INCL AP PELVIS    CLINICAL HISTORY:  Pain in left hip    TECHNIQUE:  AP view of the pelvis and frog leg lateral view of the right hip were performed.    COMPARISON:  None    FINDINGS:  There are postoperative changes of right total hip arthroplasty.  Prosthesis position and alignment are satisfactory without radiographic evidence of prosthesis loosening or other complicating process.  Left hip joint space is maintained with mild acetabular lipping.  Degenerative changes present in the visualized lower lumbar spine and sacroiliac joints.  No acute fracture or dislocation.  Impression: As above.    Electronically signed by: DELMAR Figueroa MD  Date:    06/14/2018  Time:    09:13        PLAN:    Patient may benefit from physical therapy exercises with the massage his and iontophoresis.    The patient can follow up p.r.n. for a steroid injection into the right greater trochanteric bursa area.    Anti-inflammatories or exercise and Tylenol p.r.n. for discomfort.    Follow-up in 3 weeks p.r.n..

## 2018-06-15 PROBLEM — M70.61 GREATER TROCHANTERIC BURSITIS OF RIGHT HIP: Status: ACTIVE | Noted: 2018-06-15

## 2018-06-15 NOTE — PATIENT INSTRUCTIONS
What is bursitis?  A bursa is a fluid-filled sac that helps cushion the muscles, tendons, and bones around a joint. When a bursa becomes inflamed, its called bursitis. Common symptoms of bursitis include pain, tenderness, and swelling that limits movement of the joint.  What causes bursitis?  Bursitis is most often caused by overuse of a joint. The repeated movements irritate the bursa and may cause it to swell. When that happens, other surrounding tissues may become inflamed or have less space to move. Bursitis is most common in large joints such as the knee, shoulder, and hip.       Nonsurgical treatment involves both rest and exercise.    How is bursitis treated?  To help reduce pain and swelling, your healthcare provider may recommend one or more of the following:   · Rest gives the bursa time to heal. This means limiting activities that put stress on the joint.  · Anti-inflammatory medications help reduce painful swelling. In some cases, this can include injections of cortisone or other steroid medicines into the bursa.  · Splints and support bandages improve your comfort and allow the bursa to heal.  · Physical therapy may be used to increase flexibility and strengthen muscles that support the joint.  · Aspiration removes extra fluid from the bursa using a needle. This can help your healthcare provider find out what is causing your bursitis. For example, it might be an infection or overuse.   · Surgery can be used to remove an inflamed or infected bursa. This is rarely needed.  Date Last Reviewed: 9/11/2015  © 5336-7554 [employer]. [address] [place] [address], [place] [address]. All rights reserved. This information is not intended as a substitute for professional medical care. Always follow your healthcare professional's instructions.

## 2018-06-18 ENCOUNTER — TELEPHONE (OUTPATIENT)
Dept: OTOLARYNGOLOGY | Facility: CLINIC | Age: 73
End: 2018-06-18

## 2018-06-18 NOTE — TELEPHONE ENCOUNTER
----- Message from Nabeel Pemberton MD sent at 6/18/2018  5:01 PM CDT -----  Please call patient with normal results.  Lipoma

## 2018-07-10 ENCOUNTER — OFFICE VISIT (OUTPATIENT)
Dept: INTERNAL MEDICINE | Facility: CLINIC | Age: 73
End: 2018-07-10
Payer: MEDICARE

## 2018-07-10 VITALS
SYSTOLIC BLOOD PRESSURE: 138 MMHG | HEIGHT: 69 IN | WEIGHT: 166 LBS | DIASTOLIC BLOOD PRESSURE: 89 MMHG | HEART RATE: 74 BPM | TEMPERATURE: 97 F | BODY MASS INDEX: 24.59 KG/M2

## 2018-07-10 DIAGNOSIS — R21 RASH: Primary | ICD-10-CM

## 2018-07-10 PROCEDURE — 99213 OFFICE O/P EST LOW 20 MIN: CPT | Mod: PBBFAC,PO | Performed by: FAMILY MEDICINE

## 2018-07-10 PROCEDURE — 99213 OFFICE O/P EST LOW 20 MIN: CPT | Mod: S$PBB,,, | Performed by: FAMILY MEDICINE

## 2018-07-10 PROCEDURE — 99999 PR PBB SHADOW E&M-EST. PATIENT-LVL III: CPT | Mod: PBBFAC,,, | Performed by: FAMILY MEDICINE

## 2018-07-10 NOTE — PROGRESS NOTES
Subjective:       Patient ID: Trip Gomes is a 73 y.o. male.    Chief Complaint: Urticaria (Onset Christmastime.)    Pt is having urticarial rash that is itching. Pt reports this has been happening since Dec.        Urticaria   This is a new problem. The current episode started more than 1 month ago. The problem has been gradually improving since onset. The rash is diffuse. The rash is characterized by redness. He was exposed to nothing. Pertinent negatives include no anorexia, congestion, diarrhea, fatigue, fever, rhinorrhea or vomiting. Past treatments include antihistamine.     Review of Systems   Constitutional: Negative for activity change, fatigue, fever and unexpected weight change.   HENT: Negative for congestion, hearing loss, rhinorrhea and trouble swallowing.    Eyes: Negative for discharge and visual disturbance.   Respiratory: Negative for chest tightness and wheezing.    Cardiovascular: Negative for chest pain and palpitations.   Gastrointestinal: Negative for anorexia, blood in stool, constipation, diarrhea and vomiting.   Endocrine: Negative for polydipsia and polyuria.   Genitourinary: Negative for difficulty urinating, hematuria and urgency.   Musculoskeletal: Negative for arthralgias, joint swelling and neck pain.   Neurological: Negative for weakness and headaches.   Psychiatric/Behavioral: Negative for confusion and dysphoric mood.       Objective:      Physical Exam   Constitutional: He is oriented to person, place, and time. He appears well-developed and well-nourished.   Cardiovascular: Normal rate and regular rhythm.    Pulmonary/Chest: Effort normal and breath sounds normal.   Neurological: He is alert and oriented to person, place, and time.   Skin: Skin is warm and dry.       Assessment:       1. Rash        Plan:       Rash  Comments:  Continue on the zyrtec

## 2018-08-08 ENCOUNTER — PATIENT MESSAGE (OUTPATIENT)
Dept: INTERNAL MEDICINE | Facility: CLINIC | Age: 73
End: 2018-08-08

## 2018-08-27 ENCOUNTER — PATIENT MESSAGE (OUTPATIENT)
Dept: ADMINISTRATIVE | Facility: OTHER | Age: 73
End: 2018-08-27

## 2018-08-29 ENCOUNTER — PATIENT MESSAGE (OUTPATIENT)
Dept: ADMINISTRATIVE | Facility: OTHER | Age: 73
End: 2018-08-29

## 2018-09-10 ENCOUNTER — PATIENT OUTREACH (OUTPATIENT)
Dept: OTHER | Facility: OTHER | Age: 73
End: 2018-09-10

## 2018-09-10 NOTE — PROGRESS NOTES
"Last 5 Patient Entered Readings                                      Current 30 Day Average: 135/80     Recent Readings 9/10/2018 9/9/2018 9/9/2018 9/8/2018 9/8/2018    SBP (mmHg) 152 141 149 129 144    DBP (mmHg) 91 80 82 76 81    Pulse 62 63 62 59 64          Digital Medicine: Health  Introduction Call     9/10: Pt was busy, states he will call back.  Will follow up.  Pt called back.      Digital Medicine: Health  Introduction    Mr. Trip Gomes is a 73 y.o. male who is newly enrolled in the Digital Medicine Hypertension Program.       HYPERTENSION:  Explained that we expect patient to obtain several blood pressures per week at random times of day.   Our goal is to get  BP to consistently below 130/80mmHg and make the process convenient so patient can avoid extra trips to the office. Getting your blood pressure below 130/80mmHg (definition of control) will reduce your risk for heart attack, kidney failure, stroke and death (as well as kidney failure, eye disease, & dementia).     Patient is not meeting the goal already. Current BP average 135/80 mmHg.  When asked what the patient thinks is causing BP to be elevated, he states: "salt and hereditary (mother had high blood pressure)."  Instructed patient not to allow anyone else to use phone and BP cuff.       Discussed appropriate BP measuring technique:  Before taking your blood pressure, find a quiet place. You will need to listen for your heartbeat.  Roll up the sleeve on your left arm or remove any tight-sleeved clothing, if needed. (It's best to take your blood pressure from your left arm if you are right-handed.You can use the other arm if you have been told by your health care provider to do so.)  Rest in a chair next to a table for 5 to 10 minutes. (Your left arm should rest comfortably at heart level.)  Sit up straight with your back against the chair, legs uncrossed and on the ground.  Rest your forearm on the table with the palm of your hand " "facing up.  You should not talk, read the newspaper, or watch television during this process.      Lifestyle Assessment:    Current Dietary Habits(i.e. low sodium, food labels, dining out): Pt reports not using a salt shaker, "and trying to cut back on condiments, pizza, and processed meats."  I advised pt to start monitoring salt intake more closely and to stay under the recommended <2,000mg/day. Pt verbally acknowledged. Will send low sodium resources via e-mail.    Exercise: Pt reports he is "very active." He states he goes the gym x5days/week- 3 days cardio and 2 days strength.   Alcohol/Tobacco: Pt admits he drinks "x3-4 beer/day- standard calorie beer (150cal)".  Medication Adherence: N/A  Other goals: Will discuss next call.    Reviewed AHA/AACE recommendations:  Limit sodium intake to <2000mg/day. Pt acknowledged.   Perform 150 minutes of physical activity per week. Pt acknowledged.      Reviewed the importance of self-monitoring, medication adherence, and that the health  can be used as a resource for lifestyle modifications to help reduce or maintain a healthy lifestyle.  Reviewed that the Digital Medicine team is not available for emergencies and instructed the patient to call 911 or [facility] ([phone] or [phone]) if one arises.  Patient and I agreed that he will continue to monitor blood pressure and sodium intake and continue to remain adherent to medications.   I will plan to follow-up with the patient in 3 weeks.     Anthony DELUCA Digital Medicine Health   [phone]    "

## 2018-09-10 NOTE — LETTER
Alesia Youngblood PharmD  [address] [place] [address]     Dear Trip Gomes,    Welcome to the [facility] Digital [facility] Program!           My name is Alesia Youngblood PharmD and I am your dedicated Digital Medicine clinician.  As an expert in medication management, I will help ensure that the medications you are taking continue to provide you with the intended benefits.        I am Anthony Saucedo and I will be your health  for the duration of the program.  My  job is to help you identify lifestyle changes to improve your blood pressure control.  We will talk about nutrition, exercise, and other ways that you may be able to adjust your current habits to better your health. Together, we will work to improve your overall health and encourage you to meet your goals for a healthier lifestyle.    What we expect from YOU:    You will need to take blood pressure readings multiple times a week and no less than one reading per week.   It is important that you take your measurements at different times during the day, when possible.     What you should expect from your Digital Medicine Care Team:   We will provide you with education about high blood pressure, including lifestyle changes that could help you to control your blood pressure.   We will review your weekly readings and provide you with monthly blood pressure progress reports after you have been in the program for more than 30 days.   We will send monthly progress reports on your blood pressure control to your physician so they can follow along with your progress as well.    You will be able to reach me by phone at  or through your [facility] account by clicking my name under Care Team on the right side of the home screen.    I look forward to working with you to achieve your blood pressure goals!    Sincerely,  Alesia Youngblood PharmD  Your personal clinician    Please visit [email or web address] to learn  more about high blood pressure and what you can do lower your blood pressure.                                                                                           Trip Gomes  [address]  [place] [address]

## 2018-09-24 ENCOUNTER — PATIENT OUTREACH (OUTPATIENT)
Dept: OTHER | Facility: OTHER | Age: 73
End: 2018-09-24

## 2018-09-24 NOTE — PROGRESS NOTES
"Last 5 Patient Entered Readings                                      Current 30 Day Average: 128/75     Recent Readings 9/24/2018 9/23/2018 9/23/2018 9/23/2018 9/23/2018    SBP (mmHg) 118 120 129 133 130    DBP (mmHg) 74 74 80 78 83    Pulse 63 65 59 61 62          Digital Medicine: Health  Follow Up    Lifestyle Modifications:    1.Dietary Modifications (Sodium intake <2,000mg/day, food labels, dining out):   Pt states he is "really monitoring salt and has cut out chips, pretzels, and heavier sauces and has been reading food labels extensively."  I encouraged pt to keep up the great work and continue to monitor his intake.  Pt also reports he has cut back on the amount of alcohol is drinking in a day.    2.Physical Activity:   Pt reports still doing "3 days of cardio and 2 days of strength training every week."    3.Medication Therapy:  N/A    4.Patient has the following medication side effects/concerns: N/A  (Frequency/Alleviating factors/Precipitating factors, etc.)     Follow up with Mr. Trip Gomes completed. No further questions or concerns. Will continue to follow up to achieve health goals.  Patient is currently at goal, 128/75 mmHg does not exceed <130/80 mmHg.  "

## 2018-10-01 RX ORDER — CEFADROXIL 500 MG/1
CAPSULE ORAL
Qty: 5 CAPSULE | Refills: 4 | Status: SHIPPED | OUTPATIENT
Start: 2018-10-01 | End: 2018-10-08 | Stop reason: SDUPTHER

## 2018-10-04 ENCOUNTER — PATIENT OUTREACH (OUTPATIENT)
Dept: OTHER | Facility: OTHER | Age: 73
End: 2018-10-04

## 2018-10-04 NOTE — PROGRESS NOTES
Last 5 Patient Entered Readings                                      Current 30 Day Average: 124/73     Recent Readings 10/8/2018 10/7/2018 10/7/2018 10/6/2018 10/6/2018    SBP (mmHg) 118 129 135 112 119    DBP (mmHg) 76 72 79 66 67    Pulse 66 78 63 65 65          Called patient for digital medicine clinical pharmacist introduction. Avg BP is 124/73 mmHg. Goal <130/<80. He is not currently on any BP medications. He states that he started watching salt intakes and has noticed a downward trend in BP.     1) Continue salt restriction.  2) Discussed proper BP monitoring technique and BP goal  3) Patient knows to contact me with any non-urgent clinical changes or concerns. Go to ED or call [facility] for emergencies.   4) Will defer lifestyle counseling to digital medicine health .   5) Will follow up with patient in about 4 weeks to assess BP and need for additional medication adjustments      Alesia Youngblood, Pharm.D.   Digital Medicine Clinical Pharmacist  [phone]

## 2018-10-08 ENCOUNTER — PATIENT MESSAGE (OUTPATIENT)
Dept: ORTHOPEDICS | Facility: CLINIC | Age: 73
End: 2018-10-08

## 2018-10-08 RX ORDER — CEFADROXIL 500 MG/1
CAPSULE ORAL
Qty: 5 CAPSULE | Refills: 0 | Status: SHIPPED | OUTPATIENT
Start: 2018-10-08 | End: 2018-11-27 | Stop reason: SDUPTHER

## 2018-10-10 ENCOUNTER — PATIENT MESSAGE (OUTPATIENT)
Dept: ORTHOPEDICS | Facility: CLINIC | Age: 73
End: 2018-10-10

## 2018-10-22 ENCOUNTER — PATIENT OUTREACH (OUTPATIENT)
Dept: OTHER | Facility: OTHER | Age: 73
End: 2018-10-22

## 2018-10-22 NOTE — PROGRESS NOTES
"Last 5 Patient Entered Readings                                      Current 30 Day Average: 121/72     Recent Readings 10/21/2018 10/21/2018 10/21/2018 10/20/2018 10/20/2018    SBP (mmHg) 125 126 130 121 127    DBP (mmHg) 76 74 78 75 77    Pulse 66 64 64 68 65        Digital Medicine: Health  Follow Up    Lifestyle Modifications:    1.Dietary Modifications (Sodium intake <2,000mg/day, food labels, dining out):   Pt states he is still monitoring salt intake.  He reports has hasn't cut out "all of the salt" but he limits the "really heavy duty items".  Patient is enjoying a balanced diet and I encouraged pt to keep up the great work.    2.Physical Activity:   Pt reports he is still exercising x5/wk.    3.Medication Therapy:  N/A    4.Patient has the following medication side effects/concerns: None  (Frequency/Alleviating factors/Precipitating factors, etc.)     Follow up with Mr. Trip Gomes completed. No further questions or concerns. Will continue to follow up to achieve health goals.  Patient is currently not at goal, 121/72 mmHg does not exceed <130/80 mmHg.  Will see about setting some personal goals next outreach.        "

## 2018-11-16 NOTE — PROGRESS NOTES
Last 5 Patient Entered Readings                                      Current 30 Day Average: 119/73     Recent Readings 11/16/2018 11/16/2018 11/16/2018 11/15/2018 11/15/2018    SBP (mmHg) 122 116 109 128 119    DBP (mmHg) 72 71 72 81 73    Pulse 79 78 76 60 61        BP avg remains <130/<80. Not on BP medications. Health  scheduled to follow up with patient on 11/119/18. Will follow up in 4-6 weeks or sooner if BP trends up or down.     Olivia Beltran.D.   Digital Medicine Clinical Pharmacist  [phone]

## 2018-11-19 ENCOUNTER — PATIENT OUTREACH (OUTPATIENT)
Dept: OTHER | Facility: OTHER | Age: 73
End: 2018-11-19

## 2018-11-19 NOTE — PROGRESS NOTES
"Last 5 Patient Entered Readings                                      Current 30 Day Average: 120/73     Recent Readings 11/19/2018 11/19/2018 11/19/2018 11/18/2018 11/18/2018    SBP (mmHg) 119 119 125 122 126    DBP (mmHg) 77 74 71 78 76    Pulse 65 64 66 64 64          Digital Medicine: Health  Follow Up    Patients BP is well managed.   Patient is feeling good and is happy where he is at.       Lifestyle Modifications:    1.Dietary Modifications (Sodium intake <2,000mg/day, food labels, dining out):   Pt is still "enjoying a balanced diet".  Pt states he will have "more salt sometimes" but does not over due it.  Pt denies needing any other help with nutrition at this time.    2.Physical Activity:   Pt denies needing any help with activity at this time.  Pt reports that "he was at the gym and did an hour of cardio this morning."    3.Medication Therapy:   N/A    4.Patient has the following medication side effects/concerns: None  (Frequency/Alleviating factors/Precipitating factors, etc.)     Follow up with Mr. Trip Gomes completed. No further questions or concerns. Will continue to follow up to achieve health goals.  Patient is currently at goal, 120/73 mmHg does not exceed <130/80 mmHg.      "

## 2018-11-20 ENCOUNTER — OFFICE VISIT (OUTPATIENT)
Dept: INTERNAL MEDICINE | Facility: CLINIC | Age: 73
End: 2018-11-20
Payer: MEDICARE

## 2018-11-20 ENCOUNTER — LAB VISIT (OUTPATIENT)
Dept: LAB | Facility: HOSPITAL | Age: 73
End: 2018-11-20
Attending: FAMILY MEDICINE
Payer: MEDICARE

## 2018-11-20 VITALS
HEART RATE: 63 BPM | TEMPERATURE: 98 F | BODY MASS INDEX: 24.13 KG/M2 | DIASTOLIC BLOOD PRESSURE: 86 MMHG | SYSTOLIC BLOOD PRESSURE: 138 MMHG | HEIGHT: 69 IN | WEIGHT: 162.94 LBS

## 2018-11-20 DIAGNOSIS — Z12.5 SCREENING PSA (PROSTATE SPECIFIC ANTIGEN): ICD-10-CM

## 2018-11-20 DIAGNOSIS — E78.00 HYPERCHOLESTEREMIA: ICD-10-CM

## 2018-11-20 DIAGNOSIS — I10 ESSENTIAL HYPERTENSION: ICD-10-CM

## 2018-11-20 DIAGNOSIS — I10 ESSENTIAL HYPERTENSION: Primary | ICD-10-CM

## 2018-11-20 DIAGNOSIS — C43.62 MELANOMA OF LEFT UPPER ARM: ICD-10-CM

## 2018-11-20 LAB
ALBUMIN SERPL BCP-MCNC: 3.8 G/DL
ALP SERPL-CCNC: 60 U/L
ALT SERPL W/O P-5'-P-CCNC: 20 U/L
ANION GAP SERPL CALC-SCNC: 9 MMOL/L
AST SERPL-CCNC: 26 U/L
BASOPHILS # BLD AUTO: 0.04 K/UL
BASOPHILS NFR BLD: 0.8 %
BILIRUB SERPL-MCNC: 0.8 MG/DL
BUN SERPL-MCNC: 14 MG/DL
CALCIUM SERPL-MCNC: 9.5 MG/DL
CHLORIDE SERPL-SCNC: 105 MMOL/L
CHOLEST SERPL-MCNC: 253 MG/DL
CHOLEST/HDLC SERPL: 3.2 {RATIO}
CO2 SERPL-SCNC: 26 MMOL/L
COMPLEXED PSA SERPL-MCNC: 0.65 NG/ML
CREAT SERPL-MCNC: 0.9 MG/DL
DIFFERENTIAL METHOD: ABNORMAL
EOSINOPHIL # BLD AUTO: 0.1 K/UL
EOSINOPHIL NFR BLD: 2.5 %
ERYTHROCYTE [DISTWIDTH] IN BLOOD BY AUTOMATED COUNT: 13.4 %
EST. GFR  (AFRICAN AMERICAN): >60 ML/MIN/1.73 M^2
EST. GFR  (NON AFRICAN AMERICAN): >60 ML/MIN/1.73 M^2
GLUCOSE SERPL-MCNC: 96 MG/DL
HCT VFR BLD AUTO: 45.1 %
HDLC SERPL-MCNC: 80 MG/DL
HDLC SERPL: 31.6 %
HGB BLD-MCNC: 15.1 G/DL
IMM GRANULOCYTES # BLD AUTO: 0 K/UL
IMM GRANULOCYTES NFR BLD AUTO: 0 %
LDLC SERPL CALC-MCNC: 156 MG/DL
LYMPHOCYTES # BLD AUTO: 1.2 K/UL
LYMPHOCYTES NFR BLD: 24.8 %
MCH RBC QN AUTO: 31.6 PG
MCHC RBC AUTO-ENTMCNC: 33.5 G/DL
MCV RBC AUTO: 94 FL
MONOCYTES # BLD AUTO: 0.5 K/UL
MONOCYTES NFR BLD: 9.4 %
NEUTROPHILS # BLD AUTO: 3.1 K/UL
NEUTROPHILS NFR BLD: 62.5 %
NONHDLC SERPL-MCNC: 173 MG/DL
NRBC BLD-RTO: 0 /100 WBC
PLATELET # BLD AUTO: 210 K/UL
PMV BLD AUTO: 10.4 FL
POTASSIUM SERPL-SCNC: 4.1 MMOL/L
PROT SERPL-MCNC: 7.1 G/DL
RBC # BLD AUTO: 4.78 M/UL
SODIUM SERPL-SCNC: 140 MMOL/L
TRIGL SERPL-MCNC: 85 MG/DL
WBC # BLD AUTO: 4.88 K/UL

## 2018-11-20 PROCEDURE — 99214 OFFICE O/P EST MOD 30 MIN: CPT | Mod: S$PBB,,, | Performed by: FAMILY MEDICINE

## 2018-11-20 PROCEDURE — 36415 COLL VENOUS BLD VENIPUNCTURE: CPT | Mod: PO

## 2018-11-20 PROCEDURE — 80061 LIPID PANEL: CPT

## 2018-11-20 PROCEDURE — 85025 COMPLETE CBC W/AUTO DIFF WBC: CPT

## 2018-11-20 PROCEDURE — 84153 ASSAY OF PSA TOTAL: CPT

## 2018-11-20 PROCEDURE — 80053 COMPREHEN METABOLIC PANEL: CPT

## 2018-11-20 PROCEDURE — 99213 OFFICE O/P EST LOW 20 MIN: CPT | Mod: PBBFAC,PO | Performed by: FAMILY MEDICINE

## 2018-11-20 PROCEDURE — 99999 PR PBB SHADOW E&M-EST. PATIENT-LVL III: CPT | Mod: PBBFAC,,, | Performed by: FAMILY MEDICINE

## 2018-11-20 NOTE — PROGRESS NOTES
Subjective:       Patient ID: Trip Gomes is a 73 y.o. male.    Chief Complaint: Annual Exam    F/U:      Pt is a 73 year old here for follow-up. Pt has had from time to time elevated pressures but at home been normal. Pt cholesterol had been mid range normal. Otherwise over very heallthy      Review of Systems   Constitutional: Negative for activity change and unexpected weight change.   HENT: Negative for hearing loss, rhinorrhea and trouble swallowing.    Eyes: Negative for discharge and visual disturbance.   Respiratory: Negative for chest tightness and wheezing.    Cardiovascular: Negative for chest pain and palpitations.   Gastrointestinal: Negative for blood in stool, constipation, diarrhea and vomiting.   Endocrine: Negative for polydipsia and polyuria.   Genitourinary: Negative for difficulty urinating, hematuria and urgency.   Musculoskeletal: Negative for arthralgias, joint swelling and neck pain.   Neurological: Negative for weakness and headaches.   Psychiatric/Behavioral: Negative for confusion and dysphoric mood.       Objective:      Physical Exam   Constitutional: He is oriented to person, place, and time. He appears well-nourished.   Cardiovascular: Normal rate and regular rhythm. Exam reveals no friction rub.   No murmur heard.  Pulmonary/Chest: Effort normal and breath sounds normal.   Abdominal: Soft. Bowel sounds are normal.   Neurological: He is alert and oriented to person, place, and time.       Assessment:       1. Essential hypertension    2. Hypercholesteremia    3. Screening PSA (prostate specific antigen)    4. Melanoma of left upper arm        Plan:       Essential hypertension  Comments:  Will do CBC, CMP.     Hypercholesteremia  Comments:  Will do cholesterol     Screening PSA (prostate specific antigen)  Comments:  Will do PSA    Melanoma of left upper arm  Comments:  stable

## 2018-11-24 ENCOUNTER — PATIENT MESSAGE (OUTPATIENT)
Dept: NEUROSURGERY | Facility: CLINIC | Age: 73
End: 2018-11-24

## 2018-11-27 RX ORDER — CEFADROXIL 500 MG/1
CAPSULE ORAL
Qty: 5 CAPSULE | Refills: 0 | Status: SHIPPED | OUTPATIENT
Start: 2018-11-27 | End: 2019-05-22 | Stop reason: SDUPTHER

## 2018-12-12 ENCOUNTER — PATIENT MESSAGE (OUTPATIENT)
Dept: INTERNAL MEDICINE | Facility: CLINIC | Age: 73
End: 2018-12-12

## 2018-12-13 ENCOUNTER — TELEPHONE (OUTPATIENT)
Dept: INTERNAL MEDICINE | Facility: CLINIC | Age: 73
End: 2018-12-13

## 2018-12-13 ENCOUNTER — PATIENT MESSAGE (OUTPATIENT)
Dept: INTERNAL MEDICINE | Facility: CLINIC | Age: 73
End: 2018-12-13

## 2018-12-26 ENCOUNTER — PATIENT OUTREACH (OUTPATIENT)
Dept: OTHER | Facility: OTHER | Age: 73
End: 2018-12-26

## 2018-12-26 NOTE — PROGRESS NOTES
"Last 5 Patient Entered Readings                                      Current 30 Day Average: 119/72     Recent Readings 12/22/2018 12/22/2018 12/22/2018 12/18/2018 12/18/2018    SBP (mmHg) 122 125 129 120 116    DBP (mmHg) 76 73 77 75 71    Pulse 70 69 66 74 72          Digital Medicine: Health  Follow Up    BP is well controlled.  Patient is engaged and continue to work towards bettering his health.     Lifestyle Modifications:    1.Dietary Modifications (Sodium intake <2,000mg/day, food labels, dining out):   Patient reports keeping up with sodium restriction by using strategies that we have worked on.  Patient reports going to restaurants and asking them to "ease up on the salt on his food".  I encouraged patient to continue using those strategies and to keep up the great work.      2.Physical Activity:   Pt denies needing any other help with nutrition at this time.    3.Medication Therapy:   Patient has been compliant with the medication regimen.    4.Patient has the following medication side effects/concerns: None  (Frequency/Alleviating factors/Precipitating factors, etc.)     Follow up with Mr. Trip Gomes completed. No further questions or concerns. Will continue to follow up to achieve health goals.  Patient is currently at goal, 119/72 mmHg does not exceed <130/80 mmHg.      " see attending note ------------ATTENDING NOTE------------   pt c/o 3 weeks of intermittent gradual onset mild/moderate dull frontal throbbing headaches wrapping around to back of head, generally occur during course of day and worse in evening, no fevers, no nausea/vomiting, no change in vision/hearing, no neck pain/stiffness, normal neuro exam (AOX3, nml speech, CN grossly intact, VF/VA wnl, bilat fundi wnl, nml strength/sensation, nml gait, (-)Romberg, (-)ataxia), no jvd/bruits, describes history of migraines but these headaches sightly different, c/w tension-type headache, has increased stress, no immediate indications for neuroimaging, plan for "Headache Journal" and outpt Neuro fu, in depth dw pt about ddx, tx, foy, continued close outpt fu.  - Von Matias MD  ---------------------------------------------------

## 2018-12-31 ENCOUNTER — OFFICE VISIT (OUTPATIENT)
Dept: ORTHOPEDICS | Facility: CLINIC | Age: 73
End: 2018-12-31
Payer: MEDICARE

## 2018-12-31 ENCOUNTER — HOSPITAL ENCOUNTER (OUTPATIENT)
Dept: RADIOLOGY | Facility: HOSPITAL | Age: 73
Discharge: HOME | End: 2018-12-31
Attending: ORTHOPAEDIC SURGERY
Payer: MEDICARE

## 2018-12-31 VITALS
DIASTOLIC BLOOD PRESSURE: 89 MMHG | BODY MASS INDEX: 23.99 KG/M2 | WEIGHT: 162 LBS | HEIGHT: 69 IN | SYSTOLIC BLOOD PRESSURE: 160 MMHG | HEART RATE: 63 BPM

## 2018-12-31 DIAGNOSIS — Z96.641 HISTORY OF TOTAL RIGHT HIP REPLACEMENT: ICD-10-CM

## 2018-12-31 DIAGNOSIS — M25.551 RIGHT HIP PAIN: ICD-10-CM

## 2018-12-31 DIAGNOSIS — M70.61 GREATER TROCHANTERIC BURSITIS, RIGHT: Primary | ICD-10-CM

## 2018-12-31 DIAGNOSIS — M54.50 BILATERAL LOW BACK PAIN WITHOUT SCIATICA, UNSPECIFIED CHRONICITY: ICD-10-CM

## 2018-12-31 DIAGNOSIS — M25.551 RIGHT HIP PAIN: Primary | ICD-10-CM

## 2018-12-31 PROCEDURE — 73502 XR HIP 2 VIEW RIGHT: ICD-10-PCS | Mod: 26,RT,, | Performed by: RADIOLOGY

## 2018-12-31 PROCEDURE — 99213 OFFICE O/P EST LOW 20 MIN: CPT | Mod: PBBFAC,25,PO | Performed by: ORTHOPAEDIC SURGERY

## 2018-12-31 PROCEDURE — 99999 PR PBB SHADOW E&M-EST. PATIENT-LVL III: CPT | Mod: PBBFAC,,, | Performed by: ORTHOPAEDIC SURGERY

## 2018-12-31 PROCEDURE — 73502 X-RAY EXAM HIP UNI 2-3 VIEWS: CPT | Mod: TC,FY,PO,RT

## 2018-12-31 PROCEDURE — 73502 X-RAY EXAM HIP UNI 2-3 VIEWS: CPT | Mod: 26,RT,, | Performed by: RADIOLOGY

## 2018-12-31 PROCEDURE — 97110 THERAPEUTIC EXERCISES: CPT | Mod: GP,,, | Performed by: ORTHOPAEDIC SURGERY

## 2018-12-31 PROCEDURE — 99213 OFFICE O/P EST LOW 20 MIN: CPT | Mod: S$PBB,,, | Performed by: ORTHOPAEDIC SURGERY

## 2018-12-31 NOTE — PROGRESS NOTES
Subjective:     Patient ID: Trip Gomes is a 73 y.o. male.    Chief Complaint: Pain of the Right Hip    He is status post right hip arthroplasty by Dr. Triplett DOS 11/10/15 - DEVIN  Doing well overall  Pain rated 1/10 overall - sore at times but very active and happy with results thus far  Does a lot of stationary bike, cardio work  Also B lateral hip pain at times, axial back pain at times, no real radiation  Retired       Hip Pain    The pain is present in the right hip. The current episode started more than 1 year ago. The injury was the result of a action while at home. The problem occurs rarely. The problem has been resolved. The quality of the pain is described as aching. The pain is at a severity of 1/10. Pertinent negatives include no fever or itching. Physical therapy was not tried.      Past Medical History:   Diagnosis Date    Cervical disc displacement     Melanoma of left upper arm 2012    Dr. Wyatt     Past Surgical History:   Procedure Laterality Date    ARTHROPLASTY-HIP  Right 11/10/2015    [facility]    BASAL CELL CARCINOMA EXCISION      Right anterior tibia    FOOT SURGERY      Right 1st toe joint replacement    JOINT REPLACEMENT Right     R Hip, R big toe    KNEE ARTHROSCOPY W/ DEBRIDEMENT      Bilateral    LIPOMA RESECTION Left 06/03/2018    Dr. Pemberton at [facility]     Melanoma      Left upper arm    TOTAL HIP ARTHROPLASTY Right 11/10/2015     Family History   Problem Relation Age of Onset    Stroke Mother 67    Kidney cancer Father      Social History     Socioeconomic History    Marital status:      Spouse name: Tracy    Number of children: 1    Years of education: Not on file    Highest education level: Not on file   Social Needs    Financial resource strain: Not on file    Food insecurity - worry: Not on file    Food insecurity - inability: Not on file    Transportation needs - medical: Not on file    Transportation  needs - non-medical: Not on file   Occupational History    Occupation:      Comment: Retired   Tobacco Use    Smoking status: Never Smoker    Smokeless tobacco: Never Used   Substance and Sexual Activity    Alcohol use: Yes     Alcohol/week: 1.2 oz     Types: 2 Cans of beer per week     Comment: Daily  No alcohol 72h prior to surgery    Drug use: No    Sexual activity: Yes     Partners: Female   Other Topics Concern    Not on file   Social History Narrative    Not on file        Medication List           Accurate as of 12/31/18 12:43 PM. If you have any questions, ask your nurse or doctor.               CONTINUE taking these medications    cefadroxil 500 MG Cap  Commonly known as:  DURICEF  TAKE 1 CAPSULE BY MOUTH TWICE DAILY UNTIL ALL TAKEN STARTING THE EVENING PRIOR TO PROCEDURE.     hydrocortisone 2.5 % cream     ONE DAILY MULTIVITAMIN per tablet  Generic drug:  multivitamin          Review of patient's allergies indicates:  No Known Allergies  Review of Systems   Constitution: Negative for fever.   HENT: Negative for sore throat.    Eyes: Negative for blurred vision.   Cardiovascular: Negative for dyspnea on exertion.   Respiratory: Negative for shortness of breath.    Hematologic/Lymphatic: Does not bruise/bleed easily.   Skin: Negative for itching.   Gastrointestinal: Negative for vomiting.   Genitourinary: Negative for dysuria.   Neurological: Negative for dizziness.   Psychiatric/Behavioral: The patient does not have insomnia.        Objective:   Body mass index is 23.92 kg/m².  Vitals:    12/31/18 1039   BP: (!) 160/89   Pulse: 63           General    Nursing note and vitals reviewed.  Constitutional: He is oriented to person, place, and time. He appears well-developed. No distress.   HENT:   Head: Normocephalic and atraumatic.   Eyes: EOM are normal.   Cardiovascular: Normal rate.    Pulmonary/Chest: Effort normal. No stridor. No respiratory distress.   Neurological: He is alert  and oriented to person, place, and time.   Psychiatric: He has a normal mood and affect. His behavior is normal.     General Musculoskeletal Exam   Gait: normal         Right Hip Exam     Inspection   No deformity of hip.  Erythema: absent    Tenderness   The patient tender to palpation of the trochanteric bursa.    Range of Motion   Abduction: 20   Flexion: 100   External rotation: 50   Internal rotation: 10     Tests   Pain w/ forced internal rotation (JIMBO): present  Pain w/ forced external rotation (FADIR): absent  Log Roll: negative    Other   Sensation: normal  Left Hip Exam     Range of Motion   Abduction: 20   Flexion: 100   External rotation: 50   Internal rotation: 10     Tests   Pain w/ forced internal rotation (JIMBO): present  Pain w/ forced external rotation (FADIR): absent  Log Roll: negative    Other   Sensation: normal          Vascular Exam       Capillary Refill  Right Hand: normal capillary refill      Radiographs / Imaging : Results reviewed by me and interpreted by me, discussed with the patient and / or family     R total hip arthroplasty components appear satisfactory, no signs of loosening or failure.     Assessment:     Encounter Diagnoses   Name Primary?    Greater trochanteric bursitis, right Yes    History of total right hip replacement     Bilateral low back pain without sciatica, unspecified chronicity         Plan:     I had an in depth discussion today with Trip today regarding his right hip problem, going over his radiographs and the model to help further his understanding. I explained the anatomy and pathophysiology of the problem. I told Trip  that I believe the problem relates to right total hip arthroplasty is satisfactory and he agrees - seems more like trochanteric bursitis and on exam he is tight laterally with JIMBO . We had an in depth discussion regarding appropriate treatment and management of his condition.     From a treatment standpoint, the decision was made to  go forward with -    HEP  HEP 55365 - I, instructed and demonstrated a Bilateral hip stretching HEP. The patient then demonstrated understanding of exercises and proper technique. This program was performed for 15 minutes.     Follow up in 1 year for eval right hip

## 2019-01-08 NOTE — PROGRESS NOTES
Last 5 Patient Entered Readings                                      Current 30 Day Average: 116/70     Recent Readings 1/7/2019 1/7/2019 1/7/2019 1/4/2019 1/4/2019    SBP (mmHg) 130 123 132 111 107    DBP (mmHg) 80 76 83 67 66    Pulse 71 69 69 73 78          Reviewed chart. BP remains at goal of <130/ <80. Last CMP WNL. Not on BP medications. Health  will continue to follow up. I will continue to follow up. Patient knows to contact me with any non-urgent clinical changes or concerns. Go to ED or call [facility] for emergencies.     Alesia Youngblood, Pharm.D.   [facility] Clinical Pharmacist  [phone]

## 2019-02-06 ENCOUNTER — PATIENT OUTREACH (OUTPATIENT)
Dept: OTHER | Facility: OTHER | Age: 74
End: 2019-02-06

## 2019-02-06 NOTE — PROGRESS NOTES
"Last 5 Patient Entered Readings                                      Current 30 Day Average: 120/74     Recent Readings 2/5/2019 2/5/2019 2/5/2019 2/5/2019 2/4/2019    SBP (mmHg) 120 123 111 123 126    DBP (mmHg) 73 73 73 72 73    Pulse 63 65 64 63 72        Digital Medicine: Health  Follow Up    Encounter was brief.  BP is well controlled.     Lifestyle Modifications:    1.Dietary Modifications (Sodium intake <2,000mg/day, food labels, dining out):   Pt denies needing any other help with nutrition at this time.    2.Physical Activity:   Patient reports just getting back from the gym doing a "cardio" workout.  Patient reports continuing to stay active.     3.Medication Therapy:   None    4.Patient has the following medication side effects/concerns: None  (Frequency/Alleviating factors/Precipitating factors, etc.)     Follow up with Mr. Cochranne Mireya completed. No further questions or concerns. Will continue to follow up to achieve health goals.  Patient is currently at goal, 120/74 mmHg does not exceed <130/80 mmHg.        "

## 2019-03-12 ENCOUNTER — PATIENT MESSAGE (OUTPATIENT)
Dept: ORTHOPEDICS | Facility: CLINIC | Age: 74
End: 2019-03-12

## 2019-03-13 ENCOUNTER — PATIENT MESSAGE (OUTPATIENT)
Dept: ADMINISTRATIVE | Facility: OTHER | Age: 74
End: 2019-03-13

## 2019-03-13 NOTE — PROGRESS NOTES
Last 5 Patient Entered Readings                                      Current 30 Day Average: 121/76     Recent Readings 3/13/2019 3/13/2019 3/13/2019 3/12/2019 3/12/2019    SBP (mmHg) 127 130 138 137 132    DBP (mmHg) 80 80 81 69 75    Pulse 80 80 79 76 70        Reviewed chart. BP remains at goal of <130/<80 mmHg. Not on BP medications. Health  continues to follow up. I will continue to follow up. Patient knows to contact me with any non-urgent clinical changes or concerns. Go to ED or call [facility] for emergencies.

## 2019-03-20 ENCOUNTER — PATIENT OUTREACH (OUTPATIENT)
Dept: OTHER | Facility: OTHER | Age: 74
End: 2019-03-20

## 2019-03-20 NOTE — PROGRESS NOTES
"Last 5 Patient Entered Readings                                      Current 30 Day Average: 120/73     Recent Readings 3/19/2019 3/19/2019 3/19/2019 3/18/2019 3/18/2019    SBP (mmHg) 117 120 122 114 109    DBP (mmHg) 71 70 76 63 65    Pulse 67 68 68 70 72        Digital Medicine: Health  Follow Up    Encounter was brief.  BP is well controlled.    Lifestyle Modifications:    1.Dietary Modifications (Sodium intake <2,000mg/day, food labels, dining out):   Pt denies needing any other help with nutrition at this time.    2.Physical Activity:   Patient reports x3/wk of cardio and "a couple" days of weights.  Gave praises and encouragement to keep up the great work.     3.Medication Therapy:   Patient has been compliant with the medication regimen.    4.Patient has the following medication side effects/concerns: None  (Frequency/Alleviating factors/Precipitating factors, etc.)     Follow up with Fidel Trip Gomes completed. No further questions or concerns. Will continue to follow up to achieve health goals.  Patient is currently at goal, 120/73 mmHg does not exceed <130/80 mmHg.      "

## 2019-04-10 ENCOUNTER — PATIENT OUTREACH (OUTPATIENT)
Dept: OTHER | Facility: OTHER | Age: 74
End: 2019-04-10

## 2019-04-10 NOTE — PROGRESS NOTES
Last 5 Patient Entered Readings                                      Current 30 Day Average: 122/74     Recent Readings 4/9/2019 4/9/2019 4/9/2019 4/7/2019 4/7/2019    SBP (mmHg) 137 136 134 123 122    DBP (mmHg) 80 86 86 67 74    Pulse 59 58 58 66 64          Called patient for BP follow up. No answer. Left message for call back    Patient's BP remains controlled and meets goal of <130/<80. Per chart review, he is not taking BP medications.     Alesia Oliveira, Pharm.D.   Digital Medicine Clinical Pharmacist  [phone]

## 2019-04-11 RX ORDER — IBUPROFEN 200 MG
200 TABLET ORAL EVERY 6 HOURS PRN
COMMUNITY
End: 2020-12-01

## 2019-04-11 RX ORDER — CETIRIZINE HYDROCHLORIDE 10 MG/1
10 TABLET ORAL
COMMUNITY
End: 2020-12-01

## 2019-04-11 NOTE — PROGRESS NOTES
Last 5 Patient Entered Readings                                      Current 30 Day Average: 122/74     Recent Readings 4/11/2019 4/11/2019 4/11/2019 4/9/2019 4/9/2019    SBP (mmHg) 131 138 139 137 136    DBP (mmHg) 76 79 72 80 86    Pulse 65 66 65 59 58        Patient returned my call for BP follow up. He is doing well with no complaints. He is not taking BP medications. He reports taking ibuprofen and cetirizine occasionally. He exercises regularly.     1) BP meets goal of <130/<80. Continue lifestyle changes.   2) Patient knows to contact me with any non-urgent clinical changes or concerns. Go to ED or call [facility] for emergencies.   3) Will defer lifestyle counseling to digital medicine health .   4) Will continue to follow up.     Alesia Oliveira, Pharm.D.   Digital Medicine Clinical Pharmacist  [phone]

## 2019-05-15 ENCOUNTER — PATIENT OUTREACH (OUTPATIENT)
Dept: OTHER | Facility: OTHER | Age: 74
End: 2019-05-15

## 2019-05-15 NOTE — PROGRESS NOTES
"Last 5 Patient Entered Readings                                      Current 30 Day Average: 116/70     Recent Readings 5/11/2019 5/11/2019 5/11/2019 5/11/2019 5/10/2019    SBP (mmHg) 116 107 113 113 111    DBP (mmHg) 66 65 67 71 69    Pulse 61 61 63 61 68          Digital Medicine: Health  Follow Up    Encounter was brief.  BP is well controlled.  Patient reports everything is going great.     Lifestyle Modifications:    1.Dietary Modifications (Sodium intake <2,000mg/day, food labels, dining out):   Deferred    2.Physical Activity:   Patient reports he has "kicked his exercise up a little bit".  Patient reports he has upped his "total calorie burn" and states it is feeling good.     3.Medication Therapy:   None    4.Patient has the following medication side effects/concerns: None  (Frequency/Alleviating factors/Precipitating factors, etc.)     Follow up with Mr. Trip Gomes completed. No further questions or concerns. Will continue to follow up to achieve health goals.  Patient is currently at goal, 116/70 mmHg does not exceed <130/80 mmHg.    "

## 2019-05-21 ENCOUNTER — PATIENT MESSAGE (OUTPATIENT)
Dept: NEUROSURGERY | Facility: CLINIC | Age: 74
End: 2019-05-21

## 2019-05-22 RX ORDER — CEFADROXIL 500 MG/1
CAPSULE ORAL
Qty: 5 CAPSULE | Refills: 0 | Status: SHIPPED | OUTPATIENT
Start: 2019-05-22 | End: 2019-12-09 | Stop reason: SDUPTHER

## 2019-06-21 ENCOUNTER — PATIENT MESSAGE (OUTPATIENT)
Dept: INTERNAL MEDICINE | Facility: CLINIC | Age: 74
End: 2019-06-21

## 2019-06-25 ENCOUNTER — OFFICE VISIT (OUTPATIENT)
Dept: INTERNAL MEDICINE | Facility: CLINIC | Age: 74
End: 2019-06-25
Payer: MEDICARE

## 2019-06-25 ENCOUNTER — PES CALL (OUTPATIENT)
Dept: ADMINISTRATIVE | Facility: CLINIC | Age: 74
End: 2019-06-25

## 2019-06-25 VITALS
SYSTOLIC BLOOD PRESSURE: 136 MMHG | WEIGHT: 162.25 LBS | BODY MASS INDEX: 23.96 KG/M2 | TEMPERATURE: 97 F | OXYGEN SATURATION: 97 % | DIASTOLIC BLOOD PRESSURE: 86 MMHG | HEART RATE: 69 BPM

## 2019-06-25 DIAGNOSIS — K40.90 RIGHT INGUINAL HERNIA: Primary | ICD-10-CM

## 2019-06-25 DIAGNOSIS — C43.62 MELANOMA OF LEFT UPPER ARM: ICD-10-CM

## 2019-06-25 DIAGNOSIS — I10 ESSENTIAL HYPERTENSION: ICD-10-CM

## 2019-06-25 PROCEDURE — 99213 OFFICE O/P EST LOW 20 MIN: CPT | Mod: PBBFAC | Performed by: FAMILY MEDICINE

## 2019-06-25 PROCEDURE — 99213 OFFICE O/P EST LOW 20 MIN: CPT | Mod: S$PBB,,, | Performed by: FAMILY MEDICINE

## 2019-06-25 PROCEDURE — 99213 PR OFFICE/OUTPT VISIT, EST, LEVL III, 20-29 MIN: ICD-10-PCS | Mod: S$PBB,,, | Performed by: FAMILY MEDICINE

## 2019-06-25 PROCEDURE — 99999 PR PBB SHADOW E&M-EST. PATIENT-LVL III: ICD-10-PCS | Mod: PBBFAC,,, | Performed by: FAMILY MEDICINE

## 2019-06-25 PROCEDURE — 99999 PR PBB SHADOW E&M-EST. PATIENT-LVL III: CPT | Mod: PBBFAC,,, | Performed by: FAMILY MEDICINE

## 2019-06-25 NOTE — PROGRESS NOTES
Subjective:       Patient ID: Trip Gomes is a 74 y.o. male.    Chief Complaint: Muscle Pain (right inguinal pain per patient; x2 months; on exertion) and Follow-up (6 month follow-up)    Right Inguinal pain:      Pt report right inguinal strain. He reports feeling it when he strains.     Abdominal Pain   This is a chronic problem. The current episode started more than 1 month ago. The onset quality is sudden. The problem occurs 2 to 4 times per day. The problem has been unchanged. The pain is located in the right flank. The pain is at a severity of 2/10. The pain is mild. The quality of the pain is dull. The abdominal pain radiates to the scrotum. Associated symptoms include myalgias. Pertinent negatives include no anorexia, arthralgias, belching, constipation, diarrhea, dysuria, fever, flatus, frequency, headaches, hematochezia, hematuria, melena, nausea, vomiting or weight loss. Nothing aggravates the pain. The pain is relieved by movement. He has tried acetaminophen for the symptoms. The treatment provided moderate relief. There is no history of abdominal surgery, colon cancer, Crohn's disease, gallstones, GERD, irritable bowel syndrome, pancreatitis, PUD or ulcerative colitis. Patient's medical history includes kidney stones. Patient's medical history does not include UTI.     Review of Systems   Constitutional: Negative for fever and weight loss.   Gastrointestinal: Positive for abdominal pain. Negative for anorexia, constipation, diarrhea, flatus, hematochezia, melena, nausea and vomiting.   Genitourinary: Negative for dysuria, frequency and hematuria.   Musculoskeletal: Positive for myalgias. Negative for arthralgias.   Neurological: Negative for headaches.       Objective:      Physical Exam   Constitutional: He is oriented to person, place, and time. He appears well-developed and well-nourished.   Cardiovascular: Normal rate and regular rhythm.   Pulmonary/Chest: Effort normal and breath sounds normal.    Abdominal: A hernia (right inguinal) is present.   Neurological: He is alert and oriented to person, place, and time.       Assessment:       1. Right inguinal hernia    2. Melanoma of left upper arm    3. Essential hypertension        Plan:       Right inguinal hernia  Comments:  Will send to Gen surgery for further assessement  Orders:  -     Ambulatory consult to General Surgery    Melanoma of left upper arm  Comments:  Stable    Essential hypertension  Comments:  BP is well controlled.

## 2019-07-08 ENCOUNTER — OFFICE VISIT (OUTPATIENT)
Dept: SURGERY | Facility: CLINIC | Age: 74
End: 2019-07-08
Payer: MEDICARE

## 2019-07-08 VITALS
DIASTOLIC BLOOD PRESSURE: 87 MMHG | HEART RATE: 68 BPM | SYSTOLIC BLOOD PRESSURE: 154 MMHG | WEIGHT: 165.13 LBS | HEIGHT: 69 IN | TEMPERATURE: 98 F | BODY MASS INDEX: 24.46 KG/M2

## 2019-07-08 DIAGNOSIS — T14.8XXA MUSCLE STRAIN: Primary | ICD-10-CM

## 2019-07-08 PROCEDURE — 99999 PR PBB SHADOW E&M-EST. PATIENT-LVL III: ICD-10-PCS | Mod: PBBFAC,,, | Performed by: SURGERY

## 2019-07-08 PROCEDURE — 99203 OFFICE O/P NEW LOW 30 MIN: CPT | Mod: S$PBB,,, | Performed by: SURGERY

## 2019-07-08 PROCEDURE — 99213 OFFICE O/P EST LOW 20 MIN: CPT | Mod: PBBFAC | Performed by: SURGERY

## 2019-07-08 PROCEDURE — 99203 PR OFFICE/OUTPT VISIT, NEW, LEVL III, 30-44 MIN: ICD-10-PCS | Mod: S$PBB,,, | Performed by: SURGERY

## 2019-07-08 PROCEDURE — 99999 PR PBB SHADOW E&M-EST. PATIENT-LVL III: CPT | Mod: PBBFAC,,, | Performed by: SURGERY

## 2019-07-08 NOTE — PROGRESS NOTES
History & Physical    SUBJECTIVE:     History of Present Illness:  Patient is a 74 y.o. male referred for right inguinal hernia. Patient reports pulling sensation in his right groin that sometimes radiates towards the testicle.  He notices this with exercise and bending over to tie his shoe.  He denies any bulge or significant pain. No difficulty with urinating or bowel movements.  He is very active and exercises regularly working out with weights    Chief Complaint   Patient presents with    Consult       Review of patient's allergies indicates:  No Known Allergies    Current Outpatient Medications   Medication Sig Dispense Refill    cetirizine (ZYRTEC) 10 MG tablet Take 10 mg by mouth as needed.      hydrocortisone 2.5 % cream GUNJAN AA BID PRN  3    ibuprofen (ADVIL,MOTRIN) 200 MG tablet Take 200 mg by mouth every 6 (six) hours as needed.      multivitamin (ONE DAILY MULTIVITAMIN) per tablet Take 1 tablet by mouth once daily.      cefadroxil (DURICEF) 500 MG Cap TAKE 1 CAPSULE BY MOUTH TWICE DAILY UNTIL ALL TAKEN STARTING THE EVENING PRIOR TO PROCEDURE. 5 capsule 0     No current facility-administered medications for this visit.        Past Medical History:   Diagnosis Date    Cervical disc displacement     Melanoma of left upper arm 2012    Dr. Wyatt     Past Surgical History:   Procedure Laterality Date    ARTHROPLASTY-HIP  Right 11/10/2015    [facility]    BASAL CELL CARCINOMA EXCISION      Right anterior tibia    FOOT SURGERY      Right 1st toe joint replacement    JOINT REPLACEMENT Right     R Hip, R big toe    KNEE ARTHROSCOPY W/ DEBRIDEMENT      Bilateral    LIPOMA RESECTION Left 06/03/2018    Dr. Pemberton at [facility]     Melanoma      Left upper arm    TOTAL HIP ARTHROPLASTY Right 11/10/2015     Family History   Problem Relation Age of Onset    Stroke Mother 67    Kidney cancer Father      Social History     Tobacco Use    Smoking status: Never Smoker  "   Smokeless tobacco: Never Used   Substance Use Topics    Alcohol use: Yes     Alcohol/week: 1.2 oz     Types: 3 - 4 Cans of beer per week     Frequency: 4 or more times a week     Drinks per session: 3 or 4     Binge frequency: Never     Comment: Daily  No alcohol 72h prior to surgery    Drug use: No        Review of Systems:  Review of Systems   Constitutional: Negative for chills, fever and unexpected weight change.   HENT: Negative for congestion.    Eyes: Negative for visual disturbance.   Respiratory: Negative for shortness of breath.    Cardiovascular: Negative for chest pain.   Gastrointestinal: Negative for abdominal distention, abdominal pain, constipation, nausea, rectal pain and vomiting.   Genitourinary: Negative for dysuria.   Musculoskeletal: Negative for arthralgias.   Skin: Negative for rash.   Neurological: Negative for light-headedness.   Hematological: Negative for adenopathy.       OBJECTIVE:     Vital Signs (Most Recent)  Temp: 98.4 °F (36.9 °C) (07/08/19 0855)  Pulse: 68 (07/08/19 0855)  BP: (!) 154/87 (07/08/19 0855)  5' 9" (1.753 m)  74.9 kg (165 lb 2 oz)     Physical Exam:  Physical Exam   Constitutional: He is oriented to person, place, and time. He appears well-developed and well-nourished. No distress.   HENT:   Head: Normocephalic and atraumatic.   Eyes: EOM are normal.   Neck: Normal range of motion. Neck supple.   Cardiovascular: Normal rate and regular rhythm.   Pulmonary/Chest: Effort normal and breath sounds normal.   Abdominal: Soft. Bowel sounds are normal. He exhibits no distension. There is no tenderness. No hernia.   Neurological: He is alert and oriented to person, place, and time.   Skin: Skin is warm and dry.   Vitals reviewed.        ASSESSMENT/PLAN:     74-year-old male with right groin muscle strain    PLAN:Plan     No hernia noted on exam  Avoid offending exercises, NSAIDs p.r.n.  Follow-up p.r.n. if condition worsens can get provocative ultrasound to further " assess

## 2019-07-08 NOTE — LETTER
July 8, 2019      Sanford Hi MD  [address] [place] [address]           [facility]  [address] [place] [address]  Phone: [phone]  Fax: [phone]          Patient: Trip Gomes   MR Number: [id number]   YOB: 1945   Date of Visit: 7/8/2019       Dear Dr. Sanford Hi:    Thank you for referring rTip Gomes to me for evaluation. Attached you will find relevant portions of my assessment and plan of care.    If you have questions, please do not hesitate to call me. I look forward to following Trip Gomes along with you.    Sincerely,    Otilio Cunha MD    Enclosure  CC:  No Recipients    If you would like to receive this communication electronically, please contact [email or web address] or [phone] to request more information on [facility] access.    For providers and/or their staff who would like to refer a patient to [facility], please contact us through our one-stop-shop provider referral line, [facility], at [phone].    If you feel you have received this communication in error or would no longer like to receive these types of communications, please e-mail [email or web address]

## 2019-07-10 ENCOUNTER — PATIENT OUTREACH (OUTPATIENT)
Dept: OTHER | Facility: OTHER | Age: 74
End: 2019-07-10

## 2019-07-10 NOTE — PROGRESS NOTES
"Last 5 Patient Entered Readings                                      Current 30 Day Average: 117/71     Recent Readings 7/9/2019 7/9/2019 7/9/2019 7/8/2019 7/8/2019    SBP (mmHg) 120 118 124 126 123    DBP (mmHg) 75 71 76 71 73    Pulse 63 64 63 66 66        Digital Medicine: Health  Follow Up    Encounter was brief.  Patient reports he is doing "great".  BP is well controlled.    Lifestyle Modifications:    1.Dietary Modifications (Sodium intake <2,000mg/day, food labels, dining out):   Patient reports he continues to watch his salt intake.  Gave encouragement to patient.     2.Physical Activity:   Patient reports continuing to keep his activity "high".  Gave encouragement to patient.     3.Medication Therapy:   Patient has been compliant with the medication regimen.    4.Patient has the following medication side effects/concerns: None  (Frequency/Alleviating factors/Precipitating factors, etc.)     Follow up with Mr. Trip Gomes completed. No further questions or concerns. Will continue to follow up to achieve health goals.  Patient is currently at goal, 117/71 mmHg does not exceed <130/80 mmHg.      "

## 2019-08-07 ENCOUNTER — LAB VISIT (OUTPATIENT)
Dept: LAB | Facility: HOSPITAL | Age: 74
End: 2019-08-07
Attending: SURGERY
Payer: MEDICARE

## 2019-08-07 ENCOUNTER — TELEPHONE (OUTPATIENT)
Dept: SURGERY | Facility: CLINIC | Age: 74
End: 2019-08-07

## 2019-08-07 ENCOUNTER — OFFICE VISIT (OUTPATIENT)
Dept: SURGERY | Facility: CLINIC | Age: 74
End: 2019-08-07
Payer: MEDICARE

## 2019-08-07 VITALS
BODY MASS INDEX: 24.03 KG/M2 | DIASTOLIC BLOOD PRESSURE: 84 MMHG | TEMPERATURE: 99 F | SYSTOLIC BLOOD PRESSURE: 139 MMHG | HEIGHT: 69 IN | WEIGHT: 162.25 LBS | HEART RATE: 60 BPM

## 2019-08-07 DIAGNOSIS — R10.31 RIGHT LOWER QUADRANT ABDOMINAL PAIN: ICD-10-CM

## 2019-08-07 DIAGNOSIS — R10.31 RIGHT LOWER QUADRANT ABDOMINAL PAIN: Primary | ICD-10-CM

## 2019-08-07 LAB
CREAT SERPL-MCNC: 0.9 MG/DL (ref 0.5–1.4)
EST. GFR  (AFRICAN AMERICAN): >60 ML/MIN/1.73 M^2
EST. GFR  (NON AFRICAN AMERICAN): >60 ML/MIN/1.73 M^2

## 2019-08-07 PROCEDURE — 36415 COLL VENOUS BLD VENIPUNCTURE: CPT

## 2019-08-07 PROCEDURE — 99213 OFFICE O/P EST LOW 20 MIN: CPT | Mod: S$PBB,,, | Performed by: SURGERY

## 2019-08-07 PROCEDURE — 99999 PR PBB SHADOW E&M-EST. PATIENT-LVL III: CPT | Mod: PBBFAC,,, | Performed by: SURGERY

## 2019-08-07 PROCEDURE — 99213 PR OFFICE/OUTPT VISIT, EST, LEVL III, 20-29 MIN: ICD-10-PCS | Mod: S$PBB,,, | Performed by: SURGERY

## 2019-08-07 PROCEDURE — 99999 PR PBB SHADOW E&M-EST. PATIENT-LVL III: ICD-10-PCS | Mod: PBBFAC,,, | Performed by: SURGERY

## 2019-08-07 PROCEDURE — 99213 OFFICE O/P EST LOW 20 MIN: CPT | Mod: PBBFAC | Performed by: SURGERY

## 2019-08-07 PROCEDURE — 82565 ASSAY OF CREATININE: CPT

## 2019-08-07 NOTE — PROGRESS NOTES
History & Physical    SUBJECTIVE:     History of Present Illness:  Patient is a 74 y.o. male presents for follow-up.  He reports some cyclical right lower quadrant pain. This is usually based on his position that makes it worse.  He reports it is improved somewhat compared to last visit as he has not been exercising and working out as much.    Initially referred for right inguinal hernia. Patient reports pulling sensation in his right groin that sometimes radiates towards the testicle.  He notices this with exercise and bending over to tie his shoe.  He denies any bulge or significant pain. No difficulty with urinating or bowel movements.  He is very active and exercises regularly working out with weights    Chief Complaint   Patient presents with    Follow-up     RLQ pain       Review of patient's allergies indicates:  No Known Allergies    Current Outpatient Medications   Medication Sig Dispense Refill    cefadroxil (DURICEF) 500 MG Cap TAKE 1 CAPSULE BY MOUTH TWICE DAILY UNTIL ALL TAKEN STARTING THE EVENING PRIOR TO PROCEDURE. 5 capsule 0    cetirizine (ZYRTEC) 10 MG tablet Take 10 mg by mouth as needed.      hydrocortisone 2.5 % cream GUNJAN AA BID PRN  3    ibuprofen (ADVIL,MOTRIN) 200 MG tablet Take 200 mg by mouth every 6 (six) hours as needed.      multivitamin (ONE DAILY MULTIVITAMIN) per tablet Take 1 tablet by mouth once daily.       No current facility-administered medications for this visit.        Past Medical History:   Diagnosis Date    Cervical disc displacement     Melanoma of left upper arm 2012    Dr. Wyatt     Past Surgical History:   Procedure Laterality Date    ARTHROPLASTY-HIP  Right 11/10/2015    [facility]tt [facility]    BASAL CELL CARCINOMA EXCISION      Right anterior tibia    FOOT SURGERY      Right 1st toe joint replacement    JOINT REPLACEMENT Right     R Hip, R big toe    KNEE ARTHROSCOPY W/ DEBRIDEMENT      Bilateral    LIPOMA RESECTION Left  "06/03/2018    Dr. Pemberton at [facility]     Melanoma      Left upper arm    TOTAL HIP ARTHROPLASTY Right 11/10/2015     Family History   Problem Relation Age of Onset    Stroke Mother 67    Kidney cancer Father      Social History     Tobacco Use    Smoking status: Never Smoker    Smokeless tobacco: Never Used   Substance Use Topics    Alcohol use: Yes     Alcohol/week: 1.2 oz     Types: 3 - 4 Cans of beer per week     Frequency: 4 or more times a week     Drinks per session: 3 or 4     Binge frequency: Never     Comment: Daily  No alcohol 72h prior to surgery    Drug use: No        Review of Systems:  Review of Systems   Constitutional: Negative for chills, fever and unexpected weight change.   HENT: Negative for congestion.    Eyes: Negative for visual disturbance.   Respiratory: Negative for shortness of breath.    Cardiovascular: Negative for chest pain.   Gastrointestinal: Negative for abdominal distention, abdominal pain, constipation, nausea, rectal pain and vomiting.   Genitourinary: Negative for dysuria.   Musculoskeletal: Negative for arthralgias.   Skin: Negative for rash.   Neurological: Negative for light-headedness.   Hematological: Negative for adenopathy.       OBJECTIVE:     Vital Signs (Most Recent)  Temp: 98.7 °F (37.1 °C) (08/07/19 0850)  Pulse: 60 (08/07/19 0850)  BP: 139/84 (08/07/19 0850)  5' 9" (1.753 m)  73.6 kg (162 lb 4.1 oz)     Physical Exam:  Physical Exam   Constitutional: He is oriented to person, place, and time. He appears well-developed and well-nourished. No distress.   HENT:   Head: Normocephalic and atraumatic.   Eyes: EOM are normal.   Neck: Normal range of motion. Neck supple.   Cardiovascular: Normal rate and regular rhythm.   Pulmonary/Chest: Effort normal and breath sounds normal.   Abdominal: Soft. Bowel sounds are normal. He exhibits no distension. There is no tenderness. No hernia.   Neurological: He is alert and oriented to person, place, and time.   Skin: Skin " is warm and dry.   Vitals reviewed.        ASSESSMENT/PLAN:     74-year-old male with right lower quadrant abdominal pain    PLAN:Plan   Likely related to muscle strain  CT abdomen pelvis to rule out underlying causes such as hernia or appendicitis  Call patient with results

## 2019-08-08 ENCOUNTER — TELEPHONE (OUTPATIENT)
Dept: RADIOLOGY | Facility: HOSPITAL | Age: 74
End: 2019-08-08

## 2019-08-09 ENCOUNTER — HOSPITAL ENCOUNTER (OUTPATIENT)
Dept: RADIOLOGY | Facility: HOSPITAL | Age: 74
Discharge: HOME | End: 2019-08-09
Attending: SURGERY
Payer: MEDICARE

## 2019-08-09 DIAGNOSIS — R10.31 RIGHT LOWER QUADRANT ABDOMINAL PAIN: ICD-10-CM

## 2019-08-09 PROCEDURE — 25500020 PHARM REV CODE 255: Performed by: SURGERY

## 2019-08-09 PROCEDURE — 74177 CT ABD & PELVIS W/CONTRAST: CPT | Mod: TC

## 2019-08-09 PROCEDURE — 74177 CT ABD & PELVIS W/CONTRAST: CPT | Mod: 26,,, | Performed by: RADIOLOGY

## 2019-08-09 PROCEDURE — 74177 CT ABDOMEN PELVIS WITH CONTRAST: ICD-10-PCS | Mod: 26,,, | Performed by: RADIOLOGY

## 2019-08-09 RX ADMIN — IOHEXOL 30 ML: 350 INJECTION, SOLUTION INTRAVENOUS at 01:08

## 2019-08-09 RX ADMIN — IOHEXOL 75 ML: 350 INJECTION, SOLUTION INTRAVENOUS at 02:08

## 2019-08-11 ENCOUNTER — PATIENT MESSAGE (OUTPATIENT)
Dept: SURGERY | Facility: HOSPITAL | Age: 74
End: 2019-08-11

## 2019-08-12 NOTE — TELEPHONE ENCOUNTER
CT results discussed with patient. Will continue conservative management for likely muscle strain.  Patient will call if no improvement

## 2019-08-23 ENCOUNTER — PES CALL (OUTPATIENT)
Dept: ADMINISTRATIVE | Facility: CLINIC | Age: 74
End: 2019-08-23

## 2019-09-04 ENCOUNTER — PATIENT OUTREACH (OUTPATIENT)
Dept: OTHER | Facility: OTHER | Age: 74
End: 2019-09-04

## 2019-09-04 NOTE — PROGRESS NOTES
Last 5 Patient Entered Readings                                      Current 30 Day Average: 115/68     Recent Readings 9/4/2019 9/4/2019 9/4/2019 9/3/2019 9/3/2019    Pulse 65 67 66 64 61        Digital Medicine: Health  Follow Up    Encounter was brief.  Addressed lack of BP readings, patient reports he has been taking readings.  Transferred patient to [employer] support.  Patient denies any other concerns at this time.      Lifestyle Modifications:    1.Dietary Modifications (Sodium intake <2,000mg/day, food labels, dining out):   Patient reports he continues to work on salt restriction and denies needing any other help with nutrition at this time.    2.Physical Activity:   Patient reports he continues to work out and denies needing any other help with activity at this time.    3.Medication Therapy:   Patient has been compliant with the medication regimen.    4.Patient has the following medication side effects/concerns: None  (Frequency/Alleviating factors/Precipitating factors, etc.)     Follow up with Mr. Trip Gomes completed. No further questions or concerns. Will continue to follow up to achieve health goals.  Patient is currently at goal, 115/68 mmHg does not exceed <130/80 mmHg.

## 2019-09-09 ENCOUNTER — PATIENT MESSAGE (OUTPATIENT)
Dept: ADMINISTRATIVE | Facility: OTHER | Age: 74
End: 2019-09-09

## 2019-10-11 ENCOUNTER — PATIENT OUTREACH (OUTPATIENT)
Dept: OTHER | Facility: OTHER | Age: 74
End: 2019-10-11

## 2019-10-11 NOTE — PROGRESS NOTES
Called patient for BP follow up. No answer. Left message for call back    30-day BP average remains at goal of <130/<80 mmHg.

## 2019-10-30 ENCOUNTER — PATIENT MESSAGE (OUTPATIENT)
Dept: ORTHOPEDICS | Facility: CLINIC | Age: 74
End: 2019-10-30

## 2019-10-30 DIAGNOSIS — M25.551 PAIN OF RIGHT HIP JOINT: Primary | ICD-10-CM

## 2019-10-31 ENCOUNTER — HOSPITAL ENCOUNTER (OUTPATIENT)
Dept: RADIOLOGY | Facility: HOSPITAL | Age: 74
Discharge: HOME | End: 2019-10-31
Attending: ORTHOPAEDIC SURGERY
Payer: MEDICARE

## 2019-10-31 ENCOUNTER — PATIENT OUTREACH (OUTPATIENT)
Dept: ADMINISTRATIVE | Facility: HOSPITAL | Age: 74
End: 2019-10-31

## 2019-10-31 ENCOUNTER — OFFICE VISIT (OUTPATIENT)
Dept: SPORTS MEDICINE | Facility: CLINIC | Age: 74
End: 2019-10-31
Payer: MEDICARE

## 2019-10-31 VITALS
HEART RATE: 66 BPM | HEIGHT: 69 IN | BODY MASS INDEX: 23.99 KG/M2 | WEIGHT: 162 LBS | DIASTOLIC BLOOD PRESSURE: 80 MMHG | SYSTOLIC BLOOD PRESSURE: 155 MMHG

## 2019-10-31 DIAGNOSIS — M16.11 ARTHRITIS OF RIGHT HIP: Primary | ICD-10-CM

## 2019-10-31 DIAGNOSIS — Z96.641 HISTORY OF TOTAL HIP REPLACEMENT, RIGHT: ICD-10-CM

## 2019-10-31 DIAGNOSIS — M25.551 PAIN OF RIGHT HIP JOINT: ICD-10-CM

## 2019-10-31 PROCEDURE — 73502 XR HIP 2 VIEW RIGHT: ICD-10-PCS | Mod: 26,RT,, | Performed by: RADIOLOGY

## 2019-10-31 PROCEDURE — 73502 X-RAY EXAM HIP UNI 2-3 VIEWS: CPT | Mod: TC,RT

## 2019-10-31 PROCEDURE — 99999 PR PBB SHADOW E&M-EST. PATIENT-LVL III: CPT | Mod: PBBFAC,,, | Performed by: ORTHOPAEDIC SURGERY

## 2019-10-31 PROCEDURE — 99999 PR PBB SHADOW E&M-EST. PATIENT-LVL III: ICD-10-PCS | Mod: PBBFAC,,, | Performed by: ORTHOPAEDIC SURGERY

## 2019-10-31 PROCEDURE — 99213 PR OFFICE/OUTPT VISIT, EST, LEVL III, 20-29 MIN: ICD-10-PCS | Mod: S$PBB,,, | Performed by: ORTHOPAEDIC SURGERY

## 2019-10-31 PROCEDURE — 99213 OFFICE O/P EST LOW 20 MIN: CPT | Mod: S$PBB,,, | Performed by: ORTHOPAEDIC SURGERY

## 2019-10-31 PROCEDURE — 73502 X-RAY EXAM HIP UNI 2-3 VIEWS: CPT | Mod: 26,RT,, | Performed by: RADIOLOGY

## 2019-10-31 PROCEDURE — 99213 OFFICE O/P EST LOW 20 MIN: CPT | Mod: PBBFAC,25 | Performed by: ORTHOPAEDIC SURGERY

## 2019-10-31 NOTE — PROGRESS NOTES
Subjective:     Patient ID: Trip Gomes is a 74 y.o. male.    Chief Complaint: Pain of the Right Hip    He is status post right hip arthroplasty by Dr. Ioana PINZON 11/10/15 - [facility], so now approximately 4 years post surgery  Doing well overall  Pain rated 0/10 overall to groin and proximal femur  He was having more RLQ pain, saw Dr. Cunha, CT abdomen reported relatively unremarkable and pain attributed most likely to muscle strain  Since has been much better, only occasional soreness RLQ, no groin pain  Still keeping active with exercise  Denies start up pain  Denies fevers, chills, wound drainage or redness.   Retired     Hip Pain    The pain is present in the right hip. The problem occurs rarely. The pain is at a severity of 0/10. Pertinent negatives include no fever or itching. Physical therapy was not tried.      Past Medical History:   Diagnosis Date    Cervical disc displacement     Melanoma of left upper arm 2012    Dr. Wyatt     Past Surgical History:   Procedure Laterality Date    BASAL CELL CARCINOMA EXCISION      Right anterior tibia    FOOT SURGERY      Right 1st toe joint replacement    JOINT REPLACEMENT Right     R Hip, R big toe    KNEE ARTHROSCOPY W/ DEBRIDEMENT      Bilateral    LIPOMA RESECTION Left 06/03/2018    Dr. Pemberton at [facility]     Melanoma      Left upper arm    TOTAL HIP ARTHROPLASTY Right 11/10/2015     Family History   Problem Relation Age of Onset    Stroke Mother 67    Kidney cancer Father      Social History     Socioeconomic History    Marital status:      Spouse name: Tracy    Number of children: 1    Years of education: Not on file    Highest education level: Not on file   Occupational History    Occupation:      Comment: Retired   Social Needs    Financial resource strain: Not hard at all    Food insecurity:     Worry: Never true     Inability: Never true    Transportation needs:     Medical: No     Non-medical: No    Tobacco Use    Smoking status: Never Smoker    Smokeless tobacco: Never Used   Substance and Sexual Activity    Alcohol use: Yes     Alcohol/week: 2.0 standard drinks     Types: 3 - 4 Cans of beer per week     Frequency: 4 or more times a week     Drinks per session: 3 or 4     Binge frequency: Never     Comment: Daily  No alcohol 72h prior to surgery    Drug use: No    Sexual activity: Yes     Partners: Female     Birth control/protection: None   Lifestyle    Physical activity:     Days per week: 5 days     Minutes per session: 60 min    Stress: Only a little   Relationships    Social connections:     Talks on phone: Three times a week     Gets together: Three times a week     Attends Taoism service: Not on file     Active member of club or organization: No     Attends meetings of clubs or organizations: Never     Relationship status:    Other Topics Concern    Not on file   Social History Narrative    Not on file     Medication List with Changes/Refills   Current Medications    CEFADROXIL (DURICEF) 500 MG CAP    TAKE 1 CAPSULE BY MOUTH TWICE DAILY UNTIL ALL TAKEN STARTING THE EVENING PRIOR TO PROCEDURE.    CETIRIZINE (ZYRTEC) 10 MG TABLET    Take 10 mg by mouth as needed.    HYDROCORTISONE 2.5 % CREAM    GUNJAN AA BID PRN    IBUPROFEN (ADVIL,MOTRIN) 200 MG TABLET    Take 200 mg by mouth every 6 (six) hours as needed.    MULTIVITAMIN (ONE DAILY MULTIVITAMIN) PER TABLET    Take 1 tablet by mouth once daily.     Review of patient's allergies indicates:  No Known Allergies  Review of Systems   Constitution: Negative for fever.   HENT: Negative for sore throat.    Eyes: Negative for blurred vision.   Cardiovascular: Negative for dyspnea on exertion.   Respiratory: Negative for shortness of breath.    Hematologic/Lymphatic: Does not bruise/bleed easily.   Skin: Negative for itching.   Gastrointestinal: Negative for vomiting.   Genitourinary: Negative for dysuria.   Neurological: Negative for  dizziness.   Psychiatric/Behavioral: The patient does not have insomnia.        Objective:   Body mass index is 23.92 kg/m².  Vitals:    10/31/19 0826   BP: (!) 155/80   Pulse: 66           General    Nursing note and vitals reviewed.  Constitutional: He is oriented to person, place, and time. He appears well-developed. No distress.   HENT:   Head: Normocephalic and atraumatic.   Eyes: EOM are normal.   Cardiovascular: Normal rate.    Pulmonary/Chest: Effort normal. No stridor. No respiratory distress.   Neurological: He is alert and oriented to person, place, and time.   Psychiatric: He has a normal mood and affect. His behavior is normal.     General Musculoskeletal Exam   Gait: normal         Right Hip Exam     Inspection   Right hip scars:  well healed surgical incision.  No deformity of hip.  Erythema: absent    Range of Motion   Abduction: 20   Flexion: 100   External rotation: 50   Internal rotation: 10     Tests   Pain w/ forced internal rotation (JIMBO): absent  Pain w/ forced external rotation (FADIR): absent  Log Roll: negative    Other   Sensation: normal  Left Hip Exam     Range of Motion   Abduction: 20   Flexion: 100   External rotation: 50   Internal rotation: 10     Tests   Pain w/ forced internal rotation (JIMBO): absent  Pain w/ forced external rotation (FADIR): absent  Log Roll: negative    Other   Sensation: normal          Muscle Strength   Right Lower Extremity   Hip Abduction: 5/5   Hip Flexion: 5/5   Left Lower Extremity   Hip Abduction: 5/5   Hip Flexion: 5/5     Vascular Exam       Capillary Refill  Right Hand: normal capillary refill      IMAGING Radiographs / Imaging : Results reviewed by me and interpreted by me, discussed with the patient and / or family     X-Ray Hip 2 or 3 views Right  Narrative: EXAMINATION:  XR HIP 2 VIEW RIGHT    CLINICAL HISTORY:  Pain in right hip    TECHNIQUE:  AP view of the pelvis and frog leg lateral view of the right hip were  performed.    COMPARISON:  December 31, 2018    FINDINGS:  Stable postsurgical changes right total hip arthroplasty.  Acetabular and femoral components stable in position without fracture, dislocation or loosening.    Osteopenia and degenerative change noted in the included lumbosacral spine, bilateral SI and left hip joints.  Pelvic ring is intact.  Impression: As above.    Electronically signed by: Dharmesh Sanchez MD  Date:    10/31/2019  Time:    08:24    CT reviewed from August as well as R hip films from today, components are well aligned, no signs of loosening or fracture, no dislocation, no subsidence    Assessment:     Encounter Diagnoses   Name Primary?    Arthritis of right hip Yes    History of total hip replacement, right         Plan:     I had an in depth discussion today with Trip today regarding his right hip, going over his radiographs and the CT to help further his understanding. I explained the anatomy and pathophysiology. I told Trip  that I believe the problem relates to well functioning right total hip prosthesis. I do not see any evidence that the hip is causing abdominal pain or RLQ pain.  We had an in depth discussion regarding appropriate treatment and management of his condition.     From a treatment standpoint, the decision was made to go forward with :     Certainly if the pain worsens or changes we can re evaluate, but he is mostly here for surveillance to the right hip and I do think it is functioning very well and he agrees    Continue HEP    He will follow up in 1-2 years as needed with either me or Dr. Talbert our total joint surgeon    All questions answered to full satisfaction today, Mr. Gomes very agreeable with the above noted plan

## 2019-11-05 ENCOUNTER — OFFICE VISIT (OUTPATIENT)
Dept: INTERNAL MEDICINE | Facility: CLINIC | Age: 74
End: 2019-11-05
Payer: MEDICARE

## 2019-11-05 VITALS
HEIGHT: 69 IN | TEMPERATURE: 98 F | WEIGHT: 164.88 LBS | DIASTOLIC BLOOD PRESSURE: 80 MMHG | BODY MASS INDEX: 24.42 KG/M2 | SYSTOLIC BLOOD PRESSURE: 150 MMHG

## 2019-11-05 DIAGNOSIS — E78.00 HYPERCHOLESTEREMIA: ICD-10-CM

## 2019-11-05 DIAGNOSIS — Z85.820 PERSONAL HISTORY OF MALIGNANT MELANOMA OF SKIN: ICD-10-CM

## 2019-11-05 DIAGNOSIS — M15.9 PRIMARY OSTEOARTHRITIS INVOLVING MULTIPLE JOINTS: ICD-10-CM

## 2019-11-05 DIAGNOSIS — N40.0 BENIGN PROSTATIC HYPERPLASIA WITHOUT LOWER URINARY TRACT SYMPTOMS: ICD-10-CM

## 2019-11-05 DIAGNOSIS — I10 ESSENTIAL HYPERTENSION: ICD-10-CM

## 2019-11-05 DIAGNOSIS — M85.80 OSTEOPENIA DETERMINED BY X-RAY: ICD-10-CM

## 2019-11-05 DIAGNOSIS — I51.89 GRADE I DIASTOLIC DYSFUNCTION: ICD-10-CM

## 2019-11-05 DIAGNOSIS — Z85.828 PERSONAL HISTORY OF OTHER MALIGNANT NEOPLASM OF SKIN: ICD-10-CM

## 2019-11-05 DIAGNOSIS — I70.0 AORTIC ATHEROSCLEROSIS: ICD-10-CM

## 2019-11-05 DIAGNOSIS — N20.0 KIDNEY STONES: ICD-10-CM

## 2019-11-05 DIAGNOSIS — Z00.00 ENCOUNTER FOR PREVENTIVE HEALTH EXAMINATION: Primary | ICD-10-CM

## 2019-11-05 DIAGNOSIS — I77.9 CAROTID ARTERY DISEASE, UNSPECIFIED LATERALITY, UNSPECIFIED TYPE: ICD-10-CM

## 2019-11-05 PROBLEM — H93.8X9 SENSATION OF FULLNESS IN EAR: Status: RESOLVED | Noted: 2017-12-07 | Resolved: 2019-11-05

## 2019-11-05 PROBLEM — M15.0 PRIMARY OSTEOARTHRITIS INVOLVING MULTIPLE JOINTS: Status: ACTIVE | Noted: 2019-11-05

## 2019-11-05 PROBLEM — R21 RASH: Status: RESOLVED | Noted: 2018-07-10 | Resolved: 2019-11-05

## 2019-11-05 PROCEDURE — 99999 PR PBB SHADOW E&M-EST. PATIENT-LVL III: ICD-10-PCS | Mod: PBBFAC,,, | Performed by: PHYSICIAN ASSISTANT

## 2019-11-05 PROCEDURE — 99999 PR PBB SHADOW E&M-EST. PATIENT-LVL III: CPT | Mod: PBBFAC,,, | Performed by: PHYSICIAN ASSISTANT

## 2019-11-05 PROCEDURE — 99213 OFFICE O/P EST LOW 20 MIN: CPT | Mod: PBBFAC | Performed by: PHYSICIAN ASSISTANT

## 2019-11-05 PROCEDURE — G0439 PR MEDICARE ANNUAL WELLNESS SUBSEQUENT VISIT: ICD-10-PCS | Mod: S$GLB,,, | Performed by: PHYSICIAN ASSISTANT

## 2019-11-05 PROCEDURE — G0439 PPPS, SUBSEQ VISIT: HCPCS | Mod: S$GLB,,, | Performed by: PHYSICIAN ASSISTANT

## 2019-11-05 NOTE — PATIENT INSTRUCTIONS
Counseling and Referral of Other Preventative  (Italic type indicates deductible and co-insurance are waived)    Patient Name: Trip Gomes  Today's Date: 11/5/2019    Health Maintenance       Date Due Completion Date    Lipid Panel 11/20/2019 11/20/2018    Colonoscopy 05/04/2020 5/4/2010 (Done)    Override on 5/4/2010: Done    TETANUS VACCINE 02/04/2024 2/4/2014        No orders of the defined types were placed in this encounter.    The following information is provided to all patients.  This information is to help you find resources for any of the problems found today that may be affecting your health:                Living healthy guide: www.[facility].[place].[facility]      Understanding Diabetes: www.diabetes.org      Eating healthy: www.cdc.gov/healthyweight      CDC home safety checklist: www.cdc.gov/steadi/patient.html      Agency on Aging: www.goea.[place].[facility]      Alcoholics anonymous (AA): www.aa.org      Physical Activity: www.berry.nih.gov/hs3szph      Tobacco use: www.quitwithusla.org

## 2019-11-05 NOTE — PROGRESS NOTES
"Trip Gomes presented for a  Medicare AWV and comprehensive Health Risk Assessment today. The following components were reviewed and updated:    · Medical history  · Family History  · Social history  · Allergies and Current Medications  · Health Risk Assessment  · Health Maintenance  · Care Team     ** See Completed Assessments for Annual Wellness Visit within the encounter summary.**       The following assessments were completed:  · Living Situation  · CAGE  · Depression Screening  · Timed Get Up and Go  · Whisper Test  · Cognitive Function Screening  · Nutrition Screening  · ADL Screening  · PAQ Screening    Patient Care Team:  Sanford Hi MD as PCP - General (Family Medicine)  Sanford Hi MD as PCP - MSSP Attributed  Anthony Saucedo as Digital Medicine Health   Olivia BrantleyD as Hypertension Digital Medicine Clinician (Pharmacist)  Sanford Hi MD as Hypertension Digital Medicine Responsible Provider (Family Medicine)  Sidney Amado III, MD (Ophthalmology)  Medicare Shared Savings Program as Hypertension Digital Medicine Contract  Olivia GarciaD as Hypertension Digital Medicine Clinician  Renae Haines LPN as Care Coordinator  Hernando Baker MD as Consulting Physician (Orthopedic Surgery)  Elizabeth Earl MD as Consulting Physician (Dermatology)  Otilio Cunha MD as Consulting Physician (General Surgery)    Vitals:    11/05/19 0900   BP: (!) 150/80   BP Location: Right arm   Temp: 97.9 °F (36.6 °C)   TempSrc: Oral   Weight: 74.8 kg (164 lb 14.5 oz)   Height: 5' 9" (1.753 m)     Body mass index is 24.35 kg/m².     Physical Exam   Constitutional: He is oriented to person, place, and time. He appears well-developed and well-nourished. No distress.   HENT:   Head: Normocephalic and atraumatic.   Eyes: EOM are normal. No scleral icterus.   Neck: Neck supple.   Cardiovascular: Normal rate and regular rhythm.   Pulmonary/Chest: Effort normal and breath " sounds normal. No respiratory distress. He has no decreased breath sounds. He has no wheezes. He has no rhonchi. He has no rales.   Musculoskeletal: Normal range of motion.   Neurological: He is alert and oriented to person, place, and time.   Skin: Skin is dry. He is not diaphoretic.   Psychiatric: He has a normal mood and affect. His speech is normal and behavior is normal. Thought content normal.         Diagnoses and health risks identified today and associated recommendations/orders:    1. Encounter for preventive health examination  Pt to f/u with his PCP soon for statin discussion - reports he will schedule appt through [employer].    2. Aortic atherosclerosis  Stable. CT Urogram 1/12/15. See dx #1. F/u with your PCP for further management.    3. Carotid artery disease, unspecified laterality, unspecified type  Stable. CTA Head 4/12/18. Carotid artery U/S 9/7/17. See dx #1. F/u with your PCP for further management.    4. Essential hypertension  Stable with diet/exercise. BP elevated today. Pt reports he took a low dose of lisinopril years ago, but no medication for HTN recently. Pt currently enrolled in Digital Medicine Program, which shows controlled BP readings. Continue current treatment plan as prescribed by your PCP and f/u with your PCP for further management.    5. Hypercholesteremia  Stable with diet/exercise. See dx #1. F/u with your PCP for further management.  Component      Latest Ref Rng & Units 11/20/2018 12/7/2017   Cholesterol      120 - 199 mg/dL 253 (H) 257 (H)   Triglycerides      30 - 150 mg/dL 85 51   HDL      40 - 75 mg/dL 80 (H) 90 (H)   LDL Cholesterol External      63.0 - 159.0 mg/dL 156.0 156.8   Hdl/Cholesterol Ratio      20.0 - 50.0 % 31.6 35.0   Total Cholesterol/HDL Ratio      2.0 - 5.0 3.2 2.9   Non-HDL Cholesterol      mg/dL 173 167     6. Kidney stones  Stable. CT abd/pelv 8/9/19. Continue current treatment plan as prescribed by your PCP and f/u with your PCP for further  management.    7. Primary osteoarthritis involving multiple joints  Stable. Pt taking ibuprofen PRN. Continue current treatment plan as prescribed by your PCP and f/u with your PCP for further management.    8. Grade I diastolic dysfunction  Stable. 2D Echo 5/2/18. Continue current treatment plan as prescribed by your PCP and f/u with your PCP for further management.    9. Benign prostatic hyperplasia without lower urinary tract symptoms  Stable. CT abd/pelv 8/9/19. Continue current treatment plan as prescribed by your PCP and f/u with your PCP for further management.    10. Osteopenia determined by x-ray  This a new problem identified during clinic visit today. R hip xrays 10/31/19. Follow-up with PCP for evaluation and treatment plan.    11. Personal history of malignant melanoma of skin  Stable. Pt with hx melanoma to LUE s/p excision 5/2012. Sees derm for routine exams. Continue current treatment plan as prescribed by your PCP and dermatologist and f/u with them for further management.    12. Personal history of other malignant neoplasm of skin  Stable. Continue current treatment plan as prescribed by your PCP and dermatologist and f/u with them for further management.    Please obtain any medical records from past / present outside medical providers and give to PCP for review and further medical recommendations.    Provided Trip with a 5-10 year written screening schedule and personal prevention plan. Recommendations were developed using the USPSTF age appropriate recommendations. Education, counseling, and referrals were provided as needed. After Visit Summary printed and given to patient which includes a list of additional screenings\tests needed.    Follow up in about 1 year (around 11/5/2020) for HRA. F/u with PCP Dr. Hi and specialists as recommended for health management.    NIKITA Redman     I offered to discuss end of life issues, including information on how to make advance directives that the  patient could use to name someone who would make medical decisions on their behalf if they became too ill to make themselves.  _X_Patient declined - pt already has advance directives in place.   ___Patient is interested, I provided paper work and offered to discuss.

## 2019-11-20 ENCOUNTER — PATIENT OUTREACH (OUTPATIENT)
Dept: OTHER | Facility: OTHER | Age: 74
End: 2019-11-20

## 2019-11-20 NOTE — PROGRESS NOTES
Digital Medicine: Health  Follow-Up    Patient reports he is doing well.  Patient states the holidays get a little hectic but he will stay watching his diet and continue exercising.  Gave praises to patient.     Patient denies any other concerns at this time.           Follow Up  Follow-up reason(s): reading review          INTERVENTION(S)  encouragement/support    PLAN  continue monitoring          Topic    Lipid (Cholesterol) Test        Last 5 Patient Entered Readings                                      Current 30 Day Average: 118/71     Recent Readings 11/18/2019 11/18/2019 11/18/2019 11/18/2019 11/16/2019    SBP (mmHg) 117 122 123 133 125    DBP (mmHg) 74 74 75 74 71    Pulse 65 65 65 63 65

## 2019-11-21 ENCOUNTER — LAB VISIT (OUTPATIENT)
Dept: LAB | Facility: HOSPITAL | Age: 74
End: 2019-11-21
Attending: FAMILY MEDICINE
Payer: MEDICARE

## 2019-11-21 ENCOUNTER — OFFICE VISIT (OUTPATIENT)
Dept: INTERNAL MEDICINE | Facility: CLINIC | Age: 74
End: 2019-11-21
Payer: MEDICARE

## 2019-11-21 VITALS
TEMPERATURE: 98 F | BODY MASS INDEX: 24.09 KG/M2 | SYSTOLIC BLOOD PRESSURE: 122 MMHG | WEIGHT: 162.69 LBS | HEIGHT: 69 IN | DIASTOLIC BLOOD PRESSURE: 82 MMHG | HEART RATE: 69 BPM

## 2019-11-21 DIAGNOSIS — M89.9 BONE DISORDER: ICD-10-CM

## 2019-11-21 DIAGNOSIS — I51.89 GRADE I DIASTOLIC DYSFUNCTION: ICD-10-CM

## 2019-11-21 DIAGNOSIS — N40.0 BENIGN PROSTATIC HYPERPLASIA WITHOUT LOWER URINARY TRACT SYMPTOMS: ICD-10-CM

## 2019-11-21 DIAGNOSIS — M89.9 BONE DISORDER: Primary | ICD-10-CM

## 2019-11-21 LAB
25(OH)D3+25(OH)D2 SERPL-MCNC: 50 NG/ML (ref 30–96)
ALBUMIN SERPL BCP-MCNC: 4 G/DL (ref 3.5–5.2)
ALP SERPL-CCNC: 66 U/L (ref 55–135)
ALT SERPL W/O P-5'-P-CCNC: 25 U/L (ref 10–44)
ANION GAP SERPL CALC-SCNC: 6 MMOL/L (ref 8–16)
AST SERPL-CCNC: 28 U/L (ref 10–40)
BILIRUB SERPL-MCNC: 0.9 MG/DL (ref 0.1–1)
BUN SERPL-MCNC: 17 MG/DL (ref 8–23)
CALCIUM SERPL-MCNC: 10.1 MG/DL (ref 8.7–10.5)
CHLORIDE SERPL-SCNC: 106 MMOL/L (ref 95–110)
CHOLEST SERPL-MCNC: 240 MG/DL (ref 120–199)
CHOLEST/HDLC SERPL: 2.5 {RATIO} (ref 2–5)
CO2 SERPL-SCNC: 28 MMOL/L (ref 23–29)
COMPLEXED PSA SERPL-MCNC: 0.91 NG/ML (ref 0–4)
CREAT SERPL-MCNC: 1 MG/DL (ref 0.5–1.4)
EST. GFR  (AFRICAN AMERICAN): >60 ML/MIN/1.73 M^2
EST. GFR  (NON AFRICAN AMERICAN): >60 ML/MIN/1.73 M^2
GLUCOSE SERPL-MCNC: 99 MG/DL (ref 70–110)
HDLC SERPL-MCNC: 95 MG/DL (ref 40–75)
HDLC SERPL: 39.6 % (ref 20–50)
LDLC SERPL CALC-MCNC: 135.2 MG/DL (ref 63–159)
NONHDLC SERPL-MCNC: 145 MG/DL
POTASSIUM SERPL-SCNC: 5.1 MMOL/L (ref 3.5–5.1)
PROT SERPL-MCNC: 7.3 G/DL (ref 6–8.4)
SODIUM SERPL-SCNC: 140 MMOL/L (ref 136–145)
TRIGL SERPL-MCNC: 49 MG/DL (ref 30–150)

## 2019-11-21 PROCEDURE — 80061 LIPID PANEL: CPT

## 2019-11-21 PROCEDURE — 99999 PR PBB SHADOW E&M-EST. PATIENT-LVL III: CPT | Mod: PBBFAC,,, | Performed by: FAMILY MEDICINE

## 2019-11-21 PROCEDURE — 36415 COLL VENOUS BLD VENIPUNCTURE: CPT

## 2019-11-21 PROCEDURE — 80053 COMPREHEN METABOLIC PANEL: CPT

## 2019-11-21 PROCEDURE — 99999 PR PBB SHADOW E&M-EST. PATIENT-LVL III: ICD-10-PCS | Mod: PBBFAC,,, | Performed by: FAMILY MEDICINE

## 2019-11-21 PROCEDURE — 84153 ASSAY OF PSA TOTAL: CPT

## 2019-11-21 PROCEDURE — 82306 VITAMIN D 25 HYDROXY: CPT

## 2019-11-21 PROCEDURE — 99213 OFFICE O/P EST LOW 20 MIN: CPT | Mod: PBBFAC | Performed by: FAMILY MEDICINE

## 2019-11-21 PROCEDURE — 99213 OFFICE O/P EST LOW 20 MIN: CPT | Mod: S$PBB,,, | Performed by: FAMILY MEDICINE

## 2019-11-21 PROCEDURE — 1126F PR PAIN SEVERITY QUANTIFIED, NO PAIN PRESENT: ICD-10-PCS | Mod: ,,, | Performed by: FAMILY MEDICINE

## 2019-11-21 PROCEDURE — 99213 PR OFFICE/OUTPT VISIT, EST, LEVL III, 20-29 MIN: ICD-10-PCS | Mod: S$PBB,,, | Performed by: FAMILY MEDICINE

## 2019-11-21 PROCEDURE — 1126F AMNT PAIN NOTED NONE PRSNT: CPT | Mod: ,,, | Performed by: FAMILY MEDICINE

## 2019-11-21 PROCEDURE — 1159F MED LIST DOCD IN RCRD: CPT | Mod: ,,, | Performed by: FAMILY MEDICINE

## 2019-11-21 PROCEDURE — 1159F PR MEDICATION LIST DOCUMENTED IN MEDICAL RECORD: ICD-10-PCS | Mod: ,,, | Performed by: FAMILY MEDICINE

## 2019-11-21 NOTE — PROGRESS NOTES
Subjective:       Patient ID: Trip Gomes is a 74 y.o. male.    Chief Complaint: No chief complaint on file.    Pt is a 74 year old here for follow-up concern over xray of hip showed osteopenia. Pt has had no pathological fractures    Review of Systems   Constitutional: Negative for activity change and unexpected weight change.   HENT: Negative for hearing loss, rhinorrhea and trouble swallowing.    Eyes: Negative for discharge and visual disturbance.   Respiratory: Negative for chest tightness and wheezing.    Cardiovascular: Negative for chest pain and palpitations.   Gastrointestinal: Negative for blood in stool, constipation, diarrhea and vomiting.   Endocrine: Negative for polydipsia and polyuria.   Genitourinary: Negative for difficulty urinating, hematuria and urgency.   Musculoskeletal: Negative for arthralgias, joint swelling and neck pain.   Neurological: Negative for weakness and headaches.   Psychiatric/Behavioral: Negative for confusion and dysphoric mood.       Objective:      Physical Exam   Constitutional: He is oriented to person, place, and time. He appears well-developed and well-nourished.   Cardiovascular: Normal rate and regular rhythm. Exam reveals no friction rub.   No murmur heard.  Abdominal: Soft. Bowel sounds are normal.   Neurological: He is alert and oriented to person, place, and time. No cranial nerve deficit.       Assessment:       1. Bone disorder    2. Benign prostatic hyperplasia without lower urinary tract symptoms    3. Grade I diastolic dysfunction        Plan:       Bone disorder  -     DXA Bone Density Spine And Hip; Future; Expected date: 11/21/2019  -     Vitamin D; Future; Expected date: 11/21/2019    Benign prostatic hyperplasia without lower urinary tract symptoms  -     PSA, Screening; Future; Expected date: 11/21/2019    Grade I diastolic dysfunction  -     Comprehensive metabolic panel; Future; Expected date: 11/21/2019  -     Lipid panel; Future; Expected date:  11/21/2019

## 2019-11-26 ENCOUNTER — APPOINTMENT (OUTPATIENT)
Dept: RADIOLOGY | Facility: HOSPITAL | Age: 74
End: 2019-11-26
Attending: FAMILY MEDICINE
Payer: MEDICARE

## 2019-11-26 DIAGNOSIS — M89.9 BONE DISORDER: ICD-10-CM

## 2019-11-26 PROCEDURE — 77080 DXA BONE DENSITY AXIAL: CPT | Mod: TC

## 2019-11-26 PROCEDURE — 77080 DEXA BONE DENSITY SPINE HIP: ICD-10-PCS | Mod: 26,,, | Performed by: RADIOLOGY

## 2019-11-26 PROCEDURE — 77080 DXA BONE DENSITY AXIAL: CPT | Mod: 26,,, | Performed by: RADIOLOGY

## 2019-12-10 ENCOUNTER — PATIENT MESSAGE (OUTPATIENT)
Dept: OTHER | Facility: OTHER | Age: 74
End: 2019-12-10

## 2019-12-10 RX ORDER — CEFADROXIL 500 MG/1
CAPSULE ORAL
Qty: 5 CAPSULE | Refills: 0 | Status: SHIPPED | OUTPATIENT
Start: 2019-12-10 | End: 2020-07-17

## 2019-12-11 ENCOUNTER — PATIENT MESSAGE (OUTPATIENT)
Dept: OTHER | Facility: OTHER | Age: 74
End: 2019-12-11

## 2020-01-07 ENCOUNTER — PATIENT MESSAGE (OUTPATIENT)
Dept: INTERNAL MEDICINE | Facility: CLINIC | Age: 75
End: 2020-01-07

## 2020-01-08 ENCOUNTER — OFFICE VISIT (OUTPATIENT)
Dept: SURGERY | Facility: CLINIC | Age: 75
End: 2020-01-08
Payer: MEDICARE

## 2020-01-08 VITALS
WEIGHT: 164.69 LBS | TEMPERATURE: 98 F | HEART RATE: 64 BPM | BODY MASS INDEX: 24.39 KG/M2 | SYSTOLIC BLOOD PRESSURE: 150 MMHG | HEIGHT: 69 IN | DIASTOLIC BLOOD PRESSURE: 89 MMHG

## 2020-01-08 DIAGNOSIS — R10.9 RIGHT SIDED ABDOMINAL PAIN: Primary | ICD-10-CM

## 2020-01-08 PROBLEM — K40.90 RIGHT INGUINAL HERNIA: Status: RESOLVED | Noted: 2019-06-25 | Resolved: 2020-01-08

## 2020-01-08 PROCEDURE — 1125F AMNT PAIN NOTED PAIN PRSNT: CPT | Mod: ,,, | Performed by: SURGERY

## 2020-01-08 PROCEDURE — 99999 PR PBB SHADOW E&M-EST. PATIENT-LVL III: CPT | Mod: PBBFAC,,, | Performed by: SURGERY

## 2020-01-08 PROCEDURE — 1159F PR MEDICATION LIST DOCUMENTED IN MEDICAL RECORD: ICD-10-PCS | Mod: ,,, | Performed by: SURGERY

## 2020-01-08 PROCEDURE — 1125F PR PAIN SEVERITY QUANTIFIED, PAIN PRESENT: ICD-10-PCS | Mod: ,,, | Performed by: SURGERY

## 2020-01-08 PROCEDURE — 99213 OFFICE O/P EST LOW 20 MIN: CPT | Mod: PBBFAC | Performed by: SURGERY

## 2020-01-08 PROCEDURE — 99999 PR PBB SHADOW E&M-EST. PATIENT-LVL III: ICD-10-PCS | Mod: PBBFAC,,, | Performed by: SURGERY

## 2020-01-08 PROCEDURE — 1159F MED LIST DOCD IN RCRD: CPT | Mod: ,,, | Performed by: SURGERY

## 2020-01-08 PROCEDURE — 99213 OFFICE O/P EST LOW 20 MIN: CPT | Mod: S$PBB,,, | Performed by: SURGERY

## 2020-01-08 PROCEDURE — 99213 PR OFFICE/OUTPT VISIT, EST, LEVL III, 20-29 MIN: ICD-10-PCS | Mod: S$PBB,,, | Performed by: SURGERY

## 2020-01-08 NOTE — PROGRESS NOTES
History & Physical    SUBJECTIVE:     History of Present Illness:  Patient is a 74 y.o. male presents for follow-up.  He continues to have the right side abdominal pain.  He has been unable to tell if this is related to certain movements but did not change despite resting from exercise.  It will come and go on its own and peaks around a for of 10 on the pain scale.  It is somewhat of an annoyance but tolerable when not present.    presents for follow-up.  He reports some cyclical right lower quadrant pain. This is usually based on his position that makes it worse.  He reports it is improved somewhat compared to last visit as he has not been exercising and working out as much.    Initially referred for right inguinal hernia. Patient reports pulling sensation in his right groin that sometimes radiates towards the testicle.  He notices this with exercise and bending over to tie his shoe.  He denies any bulge or significant pain. No difficulty with urinating or bowel movements.  He is very active and exercises regularly working out with weights    Chief Complaint   Patient presents with    Follow-up       Review of patient's allergies indicates:  No Known Allergies    Current Outpatient Medications   Medication Sig Dispense Refill    ibuprofen (ADVIL,MOTRIN) 200 MG tablet Take 200 mg by mouth every 6 (six) hours as needed.      multivitamin (ONE DAILY MULTIVITAMIN) per tablet Take 1 tablet by mouth once daily.      cefadroxil (DURICEF) 500 MG Cap TAKE 1 CAPSULE BY MOUTH TWICE DAILY UNTIL ALL TAKEN STARTING THE EVENING PRIOR TO PROCEDURE. (Patient not taking: Reported on 1/8/2020) 5 capsule 0    cetirizine (ZYRTEC) 10 MG tablet Take 10 mg by mouth as needed.      hydrocortisone 2.5 % cream GUNJAN AA BID PRN  3     No current facility-administered medications for this visit.        Past Medical History:   Diagnosis Date    Cervical disc displacement     Hyperlipidemia     Hypertension     Kidney stones 11/5/2019  "   Melanoma of left upper arm 2012    Dr. Wyatt     Past Surgical History:   Procedure Laterality Date    BASAL CELL CARCINOMA EXCISION      Right anterior tibia    FOOT SURGERY      Right 1st toe joint replacement    JOINT REPLACEMENT Right     R Hip, R big toe    KNEE ARTHROSCOPY W/ DEBRIDEMENT      Bilateral    LIPOMA RESECTION Left 06/03/2018    Dr. Pemberton at [facility]     Melanoma      Left upper arm    TOTAL HIP ARTHROPLASTY Right 11/10/2015     Family History   Problem Relation Age of Onset    Stroke Mother 67    Kidney cancer Father      Social History     Tobacco Use    Smoking status: Never Smoker    Smokeless tobacco: Never Used   Substance Use Topics    Alcohol use: Yes     Alcohol/week: 6.0 - 7.0 standard drinks     Types: 6 - 7 Cans of beer per week     Frequency: 4 or more times a week     Drinks per session: 3 or 4     Binge frequency: Never     Comment: Daily  No alcohol 72h prior to surgery    Drug use: No        Review of Systems:  Review of Systems   Constitutional: Negative for chills, fever and unexpected weight change.   HENT: Negative for congestion.    Eyes: Negative for visual disturbance.   Respiratory: Negative for shortness of breath.    Cardiovascular: Negative for chest pain.   Gastrointestinal: Negative for abdominal distention, abdominal pain, constipation, nausea, rectal pain and vomiting.   Genitourinary: Negative for dysuria.   Musculoskeletal: Negative for arthralgias.   Skin: Negative for rash.   Neurological: Negative for light-headedness.   Hematological: Negative for adenopathy.       OBJECTIVE:     Vital Signs (Most Recent)  Temp: 97.8 °F (36.6 °C) (01/08/20 0851)  Pulse: 64 (01/08/20 0851)  BP: (!) 150/89 (01/08/20 0851)  5' 9" (1.753 m)  74.7 kg (164 lb 10.9 oz)     Physical Exam:  Physical Exam   Constitutional: He is oriented to person, place, and time. He appears well-developed and well-nourished. No distress.   HENT:   Head: Normocephalic and " atraumatic.   Eyes: EOM are normal.   Neck: Normal range of motion. Neck supple.   Cardiovascular: Normal rate and regular rhythm.   Pulmonary/Chest: Effort normal and breath sounds normal.   Abdominal: Soft. Bowel sounds are normal. He exhibits no distension. There is no tenderness. No hernia.   Neurological: He is alert and oriented to person, place, and time.   Skin: Skin is warm and dry.   Vitals reviewed.      CT:  1. Unchanged appearance of multiple small nonobstructing left upper pole renal stones and mild left-sided pelvicaliectasis.  2. Otherwise no acute findings  ASSESSMENT/PLAN:     74-year-old male with right sided abdominal pain    PLAN:Plan     Discussed options for further treatment including physical therapy/pain management referral  Patient will call if pain worsens to the point where he would like to proceed with above options at this time it is bearable.

## 2020-01-30 ENCOUNTER — PATIENT OUTREACH (OUTPATIENT)
Dept: OTHER | Facility: OTHER | Age: 75
End: 2020-01-30

## 2020-01-30 NOTE — PROGRESS NOTES
"Digital Medicine: Health  Follow-Up    Patient states he is "feeling good and sleeping good".  Patient denies any other concerns at this time.           Follow Up  Follow-up reason(s): reading review        INTERVENTION(S)  encouragement/support    PLAN  continue monitoring      There are no preventive care reminders to display for this patient.    Last 5 Patient Entered Readings                                      Current 30 Day Average: 128/74     Recent Readings 1/29/2020 1/29/2020 1/29/2020 1/24/2020 1/24/2020    SBP (mmHg) 116 119 130 125 125    DBP (mmHg) 74 69 73 76 72    Pulse 80 76 78 63 63            Diet Screening       Patient reports he is back on track with his diet.     Physical Activity Screening   When asked if exercising, patient responded: yes    He exercises for 60 minutes per day 5 day(s) a week.      Patient participates in the following activities: biking, elliptical and weights    Patient reports he is back into his regular routine after the holidays.     "

## 2020-01-31 NOTE — PROGRESS NOTES
Digital Medicine: Clinician Follow-Up    Called patient for follow up. He is doing well today with no complaints. Patient reports that BP trended up a little bit over the holidays but SBP never > 130.He says he feels fine and his BP remains controlled. Patient says that he continues engage in physical activity.     The history is provided by the patient.     Follow Up  Follow-up reason(s): routine education          INTERVENTION(S)  recommended diet modifications, recommended physical activity and encouragement/support    PLAN  patient verbalizes understanding    30-day BP average meets goal of <130/<80 mmHg. Continue lifestyle changes.       There are no preventive care reminders to display for this patient.    Last 5 Patient Entered Readings                                      Current 30 Day Average: 128/74     Recent Readings 1/29/2020 1/29/2020 1/29/2020 1/24/2020 1/24/2020    SBP (mmHg) 116 119 130 125 125    DBP (mmHg) 74 69 73 76 72    Pulse 80 76 78 63 63                        Screenings

## 2020-03-07 ENCOUNTER — PATIENT MESSAGE (OUTPATIENT)
Dept: ADMINISTRATIVE | Facility: OTHER | Age: 75
End: 2020-03-07

## 2020-04-09 ENCOUNTER — PATIENT OUTREACH (OUTPATIENT)
Dept: OTHER | Facility: OTHER | Age: 75
End: 2020-04-09

## 2020-04-09 NOTE — PROGRESS NOTES
"Digital Medicine: Health  Follow-Up    Patient reports he is doing well.  Patient denies any other concerns at this time.           Follow Up  Follow-up reason(s): reading review        INTERVENTION(S)  encouragement/support    PLAN  continue monitoring      There are no preventive care reminders to display for this patient.    Last 5 Patient Entered Readings                                      Current 30 Day Average: 117/74     Recent Readings 4/7/2020 4/7/2020 4/7/2020 4/7/2020 4/6/2020    SBP (mmHg) 123 126 142 118 119    DBP (mmHg) 75 79 84 76 75    Pulse 66 67 67 66 71            Diet Screening   No change to diet.      Physical Activity Screening   When asked if exercising, patient responded: yes    Patient participates in the following activities: walking and weights    Patient reports he has not been able to get to the gym due to covid-19.  Patient reports he has been "brisk walking" in the morning and lifting light weights around the house.  Gave praises to patient.     "

## 2020-06-18 ENCOUNTER — PATIENT OUTREACH (OUTPATIENT)
Dept: OTHER | Facility: OTHER | Age: 75
End: 2020-06-18

## 2020-06-29 PROCEDURE — 99454 REM MNTR PHYSIOL PARAM 16-30: CPT | Mod: PBBFAC | Performed by: FAMILY MEDICINE

## 2020-07-16 NOTE — PROGRESS NOTES
Digital Medicine: Health  Follow-Up    The history is provided by the patient.               Additional Follow-up details: Introduced myself as new health  for program. Patient is excited. He states that him and previous health , Darryl, had a great relationship. He states that he checked in often. I look forward to working with patient.     He states that BP has been consistently well. 100% at goal.   No complaints today.   No changes in lifestyle routine.         Diet-no change to diet    No change to diet.  Patient reports eating or drinking the following: Low sodium diet, avoids foods high in cholesterol.    Additional diet details:    Physical Activity-no change to routine  No change to exercise routine.       He exercises for 60 minutes per day 5 day(s) a week.     He physical activity details: 3 days cardio   2 days weighted exercises.  He reports being 160lb for the past 3-4 years. Content with his weight.      Medication Adherence-Medication Adherence not addressed.      Substance, Sleep, Stress-Not assessed    PLAN  Continue current diet/physical activity routine:    Patient verbalizes understanding. Patient did not express questions or concerns and patient has contact information if needed.    Explained the importance of self-monitoring and medication adherence. Encouraged the patient to communicate with their health  for lifestyle modifications to help improve or maintain a healthy lifestyle.        There are no preventive care reminders to display for this patient.    Last 5 Patient Entered Readings                                      Current 30 Day Average: 114/70     Recent Readings 7/15/2020 7/15/2020 7/15/2020 7/13/2020 7/13/2020    SBP (mmHg) 118 124 128 112 110    DBP (mmHg) 73 71 72 67 69    Pulse 70 71 69 66 68

## 2020-07-17 ENCOUNTER — PATIENT OUTREACH (OUTPATIENT)
Dept: OTHER | Facility: OTHER | Age: 75
End: 2020-07-17

## 2020-08-13 ENCOUNTER — PATIENT OUTREACH (OUTPATIENT)
Dept: OTHER | Facility: OTHER | Age: 75
End: 2020-08-13

## 2020-10-27 ENCOUNTER — PES CALL (OUTPATIENT)
Dept: ADMINISTRATIVE | Facility: CLINIC | Age: 75
End: 2020-10-27

## 2020-11-17 ENCOUNTER — TELEPHONE (OUTPATIENT)
Dept: INTERNAL MEDICINE | Facility: CLINIC | Age: 75
End: 2020-11-17

## 2020-11-17 NOTE — TELEPHONE ENCOUNTER
----- Message from Cici Harris sent at 11/17/2020  3:17 PM CST -----  Contact: Trip  Type:  Patient Returning Call    Who Called: Trip   Who Left Message for Patient: Unknown   Does the patient know what this is regarding?: Unknown   Would the patient rather a call back or a response via [facility]? Call back   Best Call Back Number: [phone]  Additional Information:       Sixto Ahmadi

## 2020-12-01 ENCOUNTER — OFFICE VISIT (OUTPATIENT)
Dept: INTERNAL MEDICINE | Facility: CLINIC | Age: 75
End: 2020-12-01
Payer: MEDICARE

## 2020-12-01 ENCOUNTER — LAB VISIT (OUTPATIENT)
Dept: LAB | Facility: HOSPITAL | Age: 75
End: 2020-12-01
Attending: FAMILY MEDICINE
Payer: MEDICARE

## 2020-12-01 VITALS
SYSTOLIC BLOOD PRESSURE: 120 MMHG | TEMPERATURE: 98 F | HEART RATE: 82 BPM | RESPIRATION RATE: 16 BRPM | WEIGHT: 160.5 LBS | BODY MASS INDEX: 23.77 KG/M2 | OXYGEN SATURATION: 99 % | DIASTOLIC BLOOD PRESSURE: 75 MMHG | HEIGHT: 69 IN

## 2020-12-01 DIAGNOSIS — I10 ESSENTIAL HYPERTENSION: ICD-10-CM

## 2020-12-01 DIAGNOSIS — Z00.00 ROUTINE HEALTH MAINTENANCE: Primary | ICD-10-CM

## 2020-12-01 DIAGNOSIS — I10 WHITE COAT SYNDROME WITH HYPERTENSION: ICD-10-CM

## 2020-12-01 DIAGNOSIS — Z12.11 SCREEN FOR COLON CANCER: ICD-10-CM

## 2020-12-01 DIAGNOSIS — I70.0 AORTIC ATHEROSCLEROSIS: ICD-10-CM

## 2020-12-01 DIAGNOSIS — Z12.5 SCREENING FOR PROSTATE CANCER: ICD-10-CM

## 2020-12-01 LAB
ALBUMIN SERPL BCP-MCNC: 3.8 G/DL (ref 3.5–5.2)
ALP SERPL-CCNC: 62 U/L (ref 55–135)
ALT SERPL W/O P-5'-P-CCNC: 25 U/L (ref 10–44)
ANION GAP SERPL CALC-SCNC: 7 MMOL/L (ref 8–16)
AST SERPL-CCNC: 27 U/L (ref 10–40)
BILIRUB SERPL-MCNC: 0.9 MG/DL (ref 0.1–1)
BUN SERPL-MCNC: 17 MG/DL (ref 8–23)
CALCIUM SERPL-MCNC: 9.3 MG/DL (ref 8.7–10.5)
CHLORIDE SERPL-SCNC: 107 MMOL/L (ref 95–110)
CHOLEST SERPL-MCNC: 233 MG/DL (ref 120–199)
CHOLEST/HDLC SERPL: 2.5 {RATIO} (ref 2–5)
CO2 SERPL-SCNC: 28 MMOL/L (ref 23–29)
COMPLEXED PSA SERPL-MCNC: 0.66 NG/ML (ref 0–4)
CREAT SERPL-MCNC: 0.9 MG/DL (ref 0.5–1.4)
EST. GFR  (AFRICAN AMERICAN): >60 ML/MIN/1.73 M^2
EST. GFR  (NON AFRICAN AMERICAN): >60 ML/MIN/1.73 M^2
GLUCOSE SERPL-MCNC: 96 MG/DL (ref 70–110)
HDLC SERPL-MCNC: 94 MG/DL (ref 40–75)
HDLC SERPL: 40.3 % (ref 20–50)
LDLC SERPL CALC-MCNC: 129 MG/DL (ref 63–159)
NONHDLC SERPL-MCNC: 139 MG/DL
POTASSIUM SERPL-SCNC: 4.3 MMOL/L (ref 3.5–5.1)
PROT SERPL-MCNC: 7.1 G/DL (ref 6–8.4)
SODIUM SERPL-SCNC: 142 MMOL/L (ref 136–145)
TRIGL SERPL-MCNC: 50 MG/DL (ref 30–150)

## 2020-12-01 PROCEDURE — 80061 LIPID PANEL: CPT

## 2020-12-01 PROCEDURE — 99999 PR PBB SHADOW E&M-EST. PATIENT-LVL III: CPT | Mod: PBBFAC,,, | Performed by: FAMILY MEDICINE

## 2020-12-01 PROCEDURE — 99397 PR PREVENTIVE VISIT,EST,65 & OVER: ICD-10-PCS | Mod: S$PBB,,, | Performed by: FAMILY MEDICINE

## 2020-12-01 PROCEDURE — 99213 OFFICE O/P EST LOW 20 MIN: CPT | Mod: PBBFAC | Performed by: FAMILY MEDICINE

## 2020-12-01 PROCEDURE — 99397 PER PM REEVAL EST PAT 65+ YR: CPT | Mod: S$PBB,,, | Performed by: FAMILY MEDICINE

## 2020-12-01 PROCEDURE — 90694 VACC AIIV4 NO PRSRV 0.5ML IM: CPT | Mod: PBBFAC

## 2020-12-01 PROCEDURE — 36415 COLL VENOUS BLD VENIPUNCTURE: CPT

## 2020-12-01 PROCEDURE — G0008 ADMIN INFLUENZA VIRUS VAC: HCPCS | Mod: PBBFAC

## 2020-12-01 PROCEDURE — 99999 PR PBB SHADOW E&M-EST. PATIENT-LVL III: ICD-10-PCS | Mod: PBBFAC,,, | Performed by: FAMILY MEDICINE

## 2020-12-01 PROCEDURE — 80053 COMPREHEN METABOLIC PANEL: CPT

## 2020-12-01 PROCEDURE — 84153 ASSAY OF PSA TOTAL: CPT | Mod: GA

## 2020-12-01 RX ORDER — PRAVASTATIN SODIUM 40 MG/1
40 TABLET ORAL DAILY
Qty: 90 TABLET | Refills: 3 | Status: SHIPPED | OUTPATIENT
Start: 2020-12-01 | End: 2021-12-09

## 2020-12-01 NOTE — PROGRESS NOTES
Subjective:       Patient ID: Trip Gomes is a 75 y.o. male.    Chief Complaint: here for physical examination and issues  below    HPI htn dxd years ago in [place] and on lisin. 5mg and eventually able to d/c. Runs higher in clinic. Digital med bps very nl. Exercises almost daily  hyperchol cv risk 18% statin Side effects and dosing discussed;hx atheroscl aorta    Past Medical History:   Diagnosis Date    Cervical disc displacement     Hyperlipidemia     Hypertension     Kidney stones 11/5/2019    Melanoma of left upper arm 2012    Dr. Wyatt    White coat syndrome with hypertension      Past Surgical History:   Procedure Laterality Date    BASAL CELL CARCINOMA EXCISION      Right anterior tibia    FOOT SURGERY      Right 1st toe joint replacement    JOINT REPLACEMENT Right     R Hip, R big toe    KNEE ARTHROSCOPY W/ DEBRIDEMENT      Bilateral    LIPOMA RESECTION Left 06/03/2018    Dr. Pemberton at [facility]     Melanoma      Left upper arm    TOTAL HIP ARTHROPLASTY Right 11/10/2015     Family History   Problem Relation Age of Onset    Stroke Mother 67    Kidney cancer Father      Social History     Socioeconomic History    Marital status:      Spouse name: Tracy    Number of children: 1    Years of education: Not on file    Highest education level: Not on file   Occupational History    Occupation:      Comment: Retired   Social Needs    Financial resource strain: Not hard at all    Food insecurity     Worry: Never true     Inability: Never true    Transportation needs     Medical: No     Non-medical: No   Tobacco Use    Smoking status: Never Smoker    Smokeless tobacco: Never Used   Substance and Sexual Activity    Alcohol use: Yes     Alcohol/week: 6.0 - 7.0 standard drinks     Types: 6 - 7 Cans of beer per week     Frequency: 4 or more times a week     Drinks per session: 3 or 4     Binge frequency: Never     Comment: 2-3 cans/day    Drug use: No     Sexual activity: Yes     Partners: Female     Birth control/protection: None   Lifestyle    Physical activity     Days per week: 5 days     Minutes per session: 60 min    Stress: Only a little   Relationships    Social connections     Talks on phone: Once a week     Gets together: Twice a week     Attends Mandaen service: Not on file     Active member of club or organization: Yes     Attends meetings of clubs or organizations: Never     Relationship status:    Other Topics Concern    Not on file   Social History Narrative    . 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   Constitutional:       Appearance: He is well-developed.   HENT:      Head: Normocephalic and atraumatic.      Comments: Partial ci     Right Ear: External ear normal.      Left Ear: External ear normal.   Eyes:      General: No scleral icterus.     Conjunctiva/sclera: Conjunctivae normal.      Pupils: Pupils are equal, round, and reactive to light.   Neck:      Musculoskeletal: Normal range of motion and neck supple.      Vascular: No carotid bruit.   Cardiovascular:      Rate and Rhythm: Normal rate and regular rhythm.      Heart sounds: Normal heart sounds. No murmur. No friction rub. No gallop.    Pulmonary:      Effort: Pulmonary effort is normal.      Breath sounds: Normal breath sounds. No wheezing.   Abdominal:      General: Bowel sounds are normal. There is no distension.      Palpations: Abdomen is soft. There is no mass.      Tenderness: There is no abdominal tenderness. There is no guarding or rebound.   Musculoskeletal: Normal range of motion.         General: No tenderness.   Lymphadenopathy:      Cervical: No cervical adenopathy.   Skin:     General: Skin  is warm and dry.      Findings: No erythema or rash.   Neurological:      Mental Status: He is alert and oriented to person, place, and time.      Cranial Nerves: No cranial nerve deficit.      Coordination: Coordination normal.   Psychiatric:         Behavior: Behavior normal.         Thought Content: Thought content normal.         Judgment: Judgment normal.         Assessment:       1. Aortic atherosclerosis    2. Carotid artery disease, unspecified laterality, unspecified type    3. Essential hypertension    4. Screening for prostate cancer    5. Screen for colon cancer        Plan:       *d/wd +/- psa.he will cont until next year then stop  Lab today  Lab 12 months and follow up after  Flu shot  Aortic atherosclerosis  -     Case Request Endoscopy: COLONOSCOPY    Carotid artery disease, unspecified laterality, unspecified type    Essential hypertension  -     Comprehensive Metabolic Panel; Future; Expected date: 12/01/2020  -     Lipid Panel; Future; Expected date: 12/01/2020  -     Comprehensive Metabolic Panel; Future; Expected date: 12/01/2021  -     Lipid Panel; Future; Expected date: 12/01/2021    Screening for prostate cancer  -     PSA, Screening; Future; Expected date: 12/01/2020  -     PSA, Screening; Future; Expected date: 12/01/2021    Screen for colon cancer    Other orders  -     pravastatin (PRAVACHOL) 40 MG tablet; Take 1 tablet (40 mg total) by mouth once daily.  Dispense: 90 tablet; Refill: 3    **

## 2020-12-02 ENCOUNTER — TELEPHONE (OUTPATIENT)
Dept: RESEARCH | Facility: HOSPITAL | Age: 75
End: 2020-12-02

## 2020-12-03 ENCOUNTER — PATIENT MESSAGE (OUTPATIENT)
Dept: ENDOSCOPY | Facility: HOSPITAL | Age: 75
End: 2020-12-03

## 2020-12-03 ENCOUNTER — RESEARCH ENCOUNTER (OUTPATIENT)
Dept: RESEARCH | Facility: CLINIC | Age: 75
End: 2020-12-03
Payer: MEDICARE

## 2020-12-03 ENCOUNTER — TELEPHONE (OUTPATIENT)
Dept: ENDOSCOPY | Facility: HOSPITAL | Age: 75
End: 2020-12-03

## 2020-12-03 ENCOUNTER — LAB VISIT (OUTPATIENT)
Dept: LAB | Facility: HOSPITAL | Age: 75
End: 2020-12-03
Attending: INTERNAL MEDICINE
Payer: MEDICARE

## 2020-12-03 DIAGNOSIS — Z00.6 RESEARCH STUDY PATIENT: Primary | ICD-10-CM

## 2020-12-03 DIAGNOSIS — I70.0 AORTIC ATHEROSCLEROSIS: Primary | ICD-10-CM

## 2020-12-03 DIAGNOSIS — Z00.6 RESEARCH STUDY PATIENT: ICD-10-CM

## 2020-12-03 LAB
DRUG STUDY TEST NAME: NORMAL
DRUG STUDY TEST RESULT: NORMAL

## 2020-12-03 PROCEDURE — 36415 COLL VENOUS BLD VENIPUNCTURE: CPT

## 2020-12-03 RX ORDER — SODIUM, POTASSIUM,MAG SULFATES 17.5-3.13G
1 SOLUTION, RECONSTITUTED, ORAL ORAL DAILY
Qty: 1 KIT | Refills: 0 | Status: SHIPPED | OUTPATIENT
Start: 2020-12-03 | End: 2020-12-05

## 2020-12-03 NOTE — PROGRESS NOTES
Screening/Vaccination Visit (Day 1)   Sponsor: [employer] [facility] [facility]   Study: A Randomized, Double-blind, Placebo-controlled Phase 3 Study to Assess the Efficacy and Safety of Ad26.COV2.S for the Prevention of SARS-CoV-2-mediated COVID-19 in Adults Aged 18 Years and Older   IRB/Protocol #: 2020.275/ [id number]; Phase 3?   Principle Investigator/Sub-Investigator: Dr. Maile Trevino  Site Number: [id number]  Location: [place]  Subject Number: [id number]     Date of Visit:   Did the Day 1 Visit Occur at Screening visit?: Yes  Subject Status: Continuing  Visit Date: 03DEC2020    Randomization:  Date of randomization: 03DEC2020  Time of randomization: 1032  Randomization done by: Kell Gilbert    Subset Selection:  Is the subject enrolled to the Immunogenicity Subset?: no  Is the subject enrolled to the Safety Subset?: no    At this time, the patient has downloaded the eCOA application onto their eDevice/ or cellular device. The participant has been trained and educated on the eCOA and express understanding on how to use this application. They understand requirement to complete/conduct all eCOA assessments prior to to any tests, procedures, or other consultations. Participant also understands that if they are unable to complete the eCOA independently, a study staff member or caretaker can collect the information on their behalf.      SIC Questionnaire:   Patient has completed all SIC questionnaires in the eCOA application, including body temperature measured by patient themselves      Nasal Swab:  Was the sample collected?: yes  Sample collected by: Angeles Sebastian  Date of collection: 03DEC2020  Time of collection: 1040  Method of SARS-CoV-2 detection: Molecular Diagnostic Test  Method of Collection: Nasal Mid-turbinate Swab  Was the sample taken from both nostrils?: yes  Accession Number: [id number]    Blood Sample Collection for Biomarker (RNA seq):  Was the sample collected?: yes  Sample  collected by: lab  Date of collection: 03DEC2020  Time of collection: 1045  Specimen Type: whole blood  mL collected: 2.5  Accession Number: [id number]    Blood Sample Collection for Humoral Immunogenicity:   Was the sample collected?: yes  Sample collected by: lab  Date of collection: 03DEC2020  Time of collection: 1045  Specimen Type: Serum  mL collected: 10 (NON-immuno subset)  Accession Number: [id number]    I have provided the participant with a thermometer, pulse-oximeter, MA-COV form, nasal swab kit, and saliva recipients. Participant (and caregiver/family member) has been trained and educated on use of these study materials. Patient reports understanding on how/when to use study materials.      End of Visit:  End of visit procedures completed by Angeles Sebastian. Patient has completed Screening/Vaccination visit (Day 1) and vaccination has been administered. Post-vaccination administration observation of at least 15 minutes has occurred. I have asked patient if they have experienced any Adverse Events during this visit, to which they have said . Any disclosed AEs will be added to the log.    Visit 2 has been scheduled for 29DEC2020 at 0800. I have expressed importance of returning on scheduling visit date and time.     The patient has a copy of the signed Informed Consent. [employer] (Token #[id number]) has been dispensed to the patient with instruction on how to use card. Patient is made aware that it can take up to 24 hrs before payment will be made available on card. [facility] Token number has been recorded in enrollment log.     Patient has been sent home with a thermometer, pulse-oximeter, MA-COV form, nasal swab kit, and saliva recipients.   Patient has been advised to contact site if he experiences any major changes in health or has any study related questions.    Screening/Vaccination Visit (Day 1)   Sponsor: [employer] [facility] [employer] [facility]   Study: A Randomized, Double-blind, Placebo-controlled Phase 3  Study to Assess the Efficacy and Safety of Ad26.COV2.S for the Prevention of SARS-CoV-2-mediated COVID-19 in Adults Aged 18 Years and Older   IRB/Protocol #: 2020.275/ [id number]; Phase 3?   Principle Investigator/Sub-Investigator: Dr. Maile Trevino  Site Number: [id number]  Location: [facility]  Subject Number: [id number]     Date of Visit:   Did the Day 1 Visit Occur at Screening visit?: Yes  Subject Status: Continuing  Visit Date: 03DEC2020  Type of Contact: Site Visit    Demographic:  What was the date of the signature on informed consent?: 03DEC2020  What is the protocol date the subject consented to at study start?: 29OCT2020  Age: 75 y.o.  Sex: Male  If female or intersex, childbearing potential: N/A  Ethnicity: Not  or   Race: White  If subject was re-screened, specify below: N/A   Previous Subject ID: N/A   Studyhub ID: [id number]    Substance Use (Tobacco/Nicotine):  Does the subject have ANY history of Tobacco and/or Nicotine use?: no  Indicate usage of the below substances:   1. Tobacco Cigarettes: Never   2. Tobacco Cigars: Never   3. Tobacco Pipes: Never   4. Nicotine Patches/Gum: Never   5. Nicotine E-cigarettes or equivalent: Never     Profession:   Are you currently working?: No  What type of work do you do?: Other: retired and N/A    Body Weight and Height:  Was body weight and height collected?: yes  Collected by: Angeles Sebastian  Date of collection: 03DEC2020   Height:  69 in  Weight: 72.8 kg  BMI: 23.7 kg/m2    Body Temperature:  Was body temperature collected?: yes  Collected by: Angeles Sebastian  Date of collection: 03DEC2020  Time of collection: 98.2  Temperature: 98.2 F  Temperature Anatomical Location: Oral    Vital Signs:  Vital signs were performed prior to blood draws and preceded by 5 or more minutes of rest in a quiet setting without distractions.  Were vital signs performed?: yes  Vitals obtained by: Angeles Sebastian  Date of collection: 03DEC2020  Time of collection:  1020AM  Position: Sitting  Pulse: 63 beats/min  Systolic BP: 137 mmHg  Diastolic BP: 77 mmHg  Respiratory Rate: 20 breaths/min  All vitals were within normal limits.    Oxygen Saturation:  Was Oxygen Saturation collected?: yes  Collected by: Angeles Sebastian  Date of collection: 03DEC2020  Time of collection: 1020AM  Oxygen Saturation: 99 %  Subject's condition of oxygen saturation measurement: room air  Oxygen Saturation is within normal limits.    Pregnancy Test:  Was the sample collected?: N/A  Sample collected by: SHAE  Date of collection: 03DEC2020   Time of collection: 1020 AM  Specimen Type: UrineNA  Pregnancy Test Result: Invalid    Medical Conditions of Interest:   N/A  if yes to any of these medical conditions, MUST be included in General Medical Hx    General Medical History:  I have reviewed and confirmed all allergies with the patient.    Has the subject experience any past and/ or concomitant diseases?: yes    Medical History Condition/Event Log below:  1. Medical Term Verbatim: Hypercholesteremia   Start Date: 14AUG2014   Ongoing?: yes   End Date: N/A  2. Medical Term Verbatim: Osteoarthritis   Start Date: 2015   Ongoing?: yes   End Date: N/A  3. Medical Term Verbatim: Melanoma of the skin   Start Date:    Ongoing?: no    End Date: N/A    Preplanned Surgeries/Procedures:   Are there any planned/scheduled surgeries/procedures which require hospitalization during the course of this trial?: no          Pre-Study Therapy:  Does patient have any pre-study therapies?: no    Prestudy therapies only collected for participants with relevant comorbidities and aged ?60 years. For these participants, all prestudy therapies (excluding vitamins, herbal supplements; non-pharmacologic therapies such as electrical stimulation, acupuncture, special diets, and exercise regimen) administered up to 30 days before vaccination must be recorded at screening.    Pre-Vaccination Symptoms:  I have checked participant for acute  illness (this does not include minor illnesses, such as diarrhea or mild upper respiratory tract infection), body temperature ?38.0°C/100.4°F, and any illness which may interfere with reactogenicity/Day 0-7 safety assessments per investigator. Patient has not had any of these events occur within 24 hours prior to the planned vaccination.     Baseline and Longitudinal Characteristics for Risk Factor Analysis:  Are you a student?: no  If Yes - Are you likely to return to school in person in 2020?: N/A  Are you retired?: yes  How often do you go in person to your main workplace (other than work-from-home)?: N/A  Does your main workplace have social distancing measures in place?: N/A  Is your main workplace cleaned on a regular basis?: N/A  Do people in your main workplace use personal protection equipment (such as masks)?: N/A  How do you get to work?: N/A  On a typical day, how many people do you interact with in person at work?: N/A  On a typical day, how many people do you interact with in person outside of work?: N/A    Living Situation:  Do you live in any of the following?: Single family home  How many people do you live with (other than yourself?: 1   Total people under 18 years of age: 0   Total people between 18-64 years of age: 0   Total people over 65 years of age: 1  Are any of the people you live with expected to return to school in person in 2020?: N/A    Community Interactions:  In the last 2 weeks, have you attended any gatherings with more than 10 people? (e.g., Hinduism, party, concert, wedding, , demonstration or other event).: No  If yes, approximately how many people were at the largest gathering?: N/A  Was the gathering an indoor or outdoor event?: N/A  How frequently do you have visitors in your residence including people completing work inside?: Weekly    Over the past month, have you been in close contact with anyone that tested positive for COVID-19?: No  If yes, is this person someone  that you live with?: Not applicable/Don't want to tell    U Questionnaire:  1. Medical Consultations:  In the last 3 months, how many times have you had medical consultations?      No Yes Type of contact (personal consultation/telemedicine If yes, specify the number of visits Indicate a reason for each visit   General Practitioner/Nurse  Practitioner  X Personal consultation 1 Annual visit   Internal Medicine/Medical Outpatient Department X       Other Specialist (Please specify):   X Dermatologist 1 Annual visit   Other (e.g., Physiotherapy, Pharmacist for a consultation; Please specify):  X         2. Professional home care:  Please indicate the need for professional care at home in the last 3 months.      No Yes Type of contact (personal consultation/telemedicine If yes, specify the number of visits Indicate a reason for each visit   General Practitioner X       Nurse/ Nurse Practitioner X       Internal Medicine/Medical Outpatient Department X       Other Specialist (Please specify):  X       Other (e.g., Physiotherapy, Pharmacist for a consultation; Please specify):  X       Supplemental oxygen X         3. Hospital Services:   In the last 3 months, did you visit the hospital?: no      No Yes If yes, specify the number of visits/admission If yes, specify length of each stay/use (days) Indicate a reason for each hospital visit   Emergency Department*        Short-term hospital visit (<24 hours admission)          Hospitalization in general fried#:   1.         Hospitalization in intensive/critical care        Mechanical ventilation use           *Please count Emergency Department visits only if the visit did not result in a hospital admission.  #Please capture type of fried and length of stay in each fried.    4. Institutional care admission(s) other than hospital:   Has there been any need for admission for care in a long-term facility, in the last 3 months?: no     No Yes If yes, specify the number of  admissions If yes, specify length of each stay (days) Indicate a reason for each institutional care admission   Long-term facilities        Rehabilitation facility        Supplemental oxygen            Randomization:  Date of randomization: 03DEC2020  Time of randomization: 1040 AM  Randomization done by: Kell Gilbert      Screening/Vaccination Visit (Day 1)   Sponsor: [employer] [facility]   Study: A Randomized, Double-blind, Placebo-controlled Phase 3 Study to Assess the Efficacy and Safety of Ad26.COV2.S for the Prevention of SARS-CoV-2-mediated COVID-19 in Adults Aged 18 Years and Older   IRB/Protocol #: 2020.275/ [id number]; Phase 3?   Principle Investigator/Sub-Investigator: Dr. Maile Trevino  Site Number: [id number]  Location: [place]  Subject Number: [id number]     Date of Visit:   Did the Day 1 Visit Occur at Screening visit?: Yes  Subject Status: Continuing  Visit Date: 03DEC2020  Type of Contact: Site Visit       Informed Consent:   Consenting was completed in private area using the most current IRB approved version of the ICF by myself and Luis F CONTRERAS and patient was given ample time to review consent prior to execution of ICF.    Prior to the Informed Consent (IC) being signed, or any study protocol required data collection, testing, procedure, or intervention being performed, the following was done and/or discussed:?    Patient was given a copy of the IC for review??    Purpose of the study and qualifications to participate??    Study design, follow up schedule, and tests or procedures done at each visit?    Confidentiality and HIPAA Authorization for Release of Medical Records for the research trial/ subject's rights/research related injury?    Risk, Benefits, Alternative Treatments, Compensation and Costs?    Participation in the research trial is voluntary and patient may withdraw at anytime?    Contact information for study related questions?     Patient  verbalizes understanding of the above: Yes?   Contact information for CRC and PI given to patient: Yes?   Patient able to adequately summarize: the purpose of the study, the risks associated with the study, and all procedures, testing, and follow-ups associated with the study: Yes?     Trip Gomes signed the IRB approved version of informed consent form for the [employer] [facility] [facility] research study.? Each page of the consent was reviewed with the patient and patient's family) and all questions answered satisfactorily. The patient signed the paper consent form and received a copy of same (copy of informed consent made and provided to patient). The original consent was scanned into electronic medical records (EPIC).    Time of Consent Execution: 1008           Inclusion/Exclusion Criteria:  Inclusion/Exclusion were reviewed with the patient by myself and Luis F Borges and confirmed by PI/Sub-I (Dr. Yoel Egan).     Inclusion Criteria (all must be YES to qualify):  1. Criterion modified per Amendment 1:  1.1 Participant must provide consent indicating that he or she understands the purpose, procedures and potential risks and benefits of the study, and is willing to participate in the study.   yes  2. Participant is willing and able to adhere to the prohibitions and restrictions specified in this protocol.   yes  3. Stages 1a and 1b: Participant is ?18 to <60 years of age on the day of signing the ICF.   No  Stages 2a and 2b: Participant is ?60 years of age on the day of signing the ICF.  Yes  4. Criterion modified per Amendment 1:  4.1 Stages 1a and 2a: In the investigator's clinical judgement, participant must be either in good or stable health, including a BMI <30 kg/m2.   Participants may have underlying illnesses (not associated with increased risk of progression to severe COVID-19, as specified in Exclusion Criteria 15), as long as their symptoms and signs are stable and well-controlled.  If participants are on medication for a condition, the medication dose must have been stable for at least 12 weeks preceding vaccination and expected to remain stable for the duration of the study. Participants will be included on the basis of relevant medical history and BMI measurement at screening.   N/A    Stages 1b and 2b: In the investigator's clinical judgement, participant may have a stable and well-controlled comorbidity associated with an increased risk of progression to severe COVID-19 (eg, stable/well-controlled HIV infection*). If participants are on medication for a comorbidity associated with an increased risk of progression to severe COVID-19, the medication dose must have been stable for at least 12 weeks preceding vaccination and expected to remain stable for the duration of the study. Participants will be included on the basis of relevant medical history and BMI measurement at screening.   * Stable/well-controlled HIV infection includes:  a. CD4 cell count ?300 cells/?L.  b. HIV viral load <50 /mL.  c. Participant must be on a stable anti-retroviral treatment (ART) for 6 months (unless the change is due to tolerability, in which case the regimen can be for only the previous 3 months; changes in formulation are allowed) and the participant must be willing to continue his/her ART throughout the study as directed by his/her local physician.    NOTE: Participants with ongoing and progressive comorbidities associated with HIV infection will be excluded but comorbidities associated with HIV infection that have been clinically stable for the past 6 months are not an exclusion criterion.     Laboratory methods for confirming a diagnosis of HIV infection are: Any evidence (historic or current) from medical records, such as PATTI with confirmation with Western Blot or PCR, or of a detectable viral load (country-specific regulatory approved tests). A laboratory result within 6 months of screening does not  need to be repeated.     If a potential participant does not have the HIV viral load and CD4 cell count in his/her medical records, they will be instructed to go to their local health care provider and obtain the necessary data for potential entry into the trial.   N/A  5. Criterion modified per Amendment 1:  5.1 Contraceptive (birth control) use should be consistent with local regulations regarding the acceptable methods of contraception for those participating in clinical studies.  Before randomization, participants must be either  a. Not of childbearing potential  b. Of childbearing potential and practicing an acceptable effective method of contraception and agrees to remain on such a method of contraception from providing consent until 3 months after administration of study vaccine. Use of hormonal contraception should start at least 28 days before the administration of study vaccine. The investigator should evaluate the potential for contraceptive method failure (eg, noncompliance, recently initiated) in relationship to the vaccination. Acceptable effective methods for this study include:  1. hormonal contraception:  i. combined (estrogen and progestogen containing) hormonal contraception associated with inhibition of ovulation (oral, intravaginal, or transdermal)  ii. progestogen-only hormonal contraception associated with inhibition of ovulation (oral, injectable, or implantable)  2. intrauterine device;  3. intrauterine hormone-releasing system;  4. bilateral tubal occlusion/ligation procedure;  5. vasectomized partner (the vasectomized partner should be the sole partner for that participant);  6. sexual abstinence*.  *Sexual abstinence is considered an effective method only if defined as refraining from heterosexual intercourse from providing consent until 3 months after receiving study vaccine. The reliability of sexual abstinence needs to be evaluated in relation to the duration of the study and the  preferred and usual lifestyle of the participant.  no subject is male  6. All participants of childbearing potential must:  d. Have a negative highly sensitive urine pregnancy test at screening  e. Have a negative highly sensitive urine pregnancy test on the day of and prior to study vaccine administration.  N/A  7. Participant agrees to not donate bone marrow, blood, and blood products from the study vaccine administration until 3 months after receiving the study vaccine.  yes  8. Must be willing to provide verifiable identification, has means to be contacted and to contact the investigator during the study.    Must be able to read, understand, and complete questionnaires in the eCOA (ie, the COVID-19 signs and symptoms surveillance question, the e-Diary, and the electronic patient-reported outcomes (ePROs).  yes    Exclusion Criteria (all must be NO to qualify):  2. Participant has a clinically significant acute illness (this does not include minor illnesses such as diarrhea or mild upper respiratory tract infection) or temperature ?38.0ºC (100.4°F) within 24 hours prior to the planned study vaccination; randomization at a later date is permitted at the discretion of the investigator and after consultation with the sponsor.  no  3. Participant has a known or suspected allergy or history of anaphylaxis or other serious adverse reactions to vaccines or their excipients (including specifically the excipients of the study vaccine; refer to the IB).  no  4. Criterion modified per Amendment 1:  3.1 Participant has abnormal function of the immune system resulting from:  a. Clinical conditions (eg, autoimmune disease, potential immune mediated disease or known or suspected immunodeficiency, chronic kidney disease [with dialysis]) expected to have an impact on the immune response of the study vaccine. Participants with clinical conditions stable under non-immunomodulator treatment (eg, autoimmune thyroiditis, autoimmune  inflammatory rheumatic disease such as rheumatoid arthritis) may be enrolled at the discretion of the investigator. Nonimmunomodulator treatment is allowed as well as steroids at a nonimmunosuppressive dose or route of administration.  b. Chronic (>10 days) or recurrent use of systemic corticosteroids within 6 months before administration of study vaccine and during the study. A substantially immunosuppressive steroid dose is considered to be ?2 weeks of daily receipt of 20 mg of prednisone or equivalent.  Note: Ocular, topical or inhaled steroids are allowed.  c. Administration of antineoplastic and immunomodulating agents or radiotherapy within 6 months before administration of study vaccine and during the study.  no  5. Participant received treatment with Ig in the 3 months or blood products in the 4 months before the planned administration of the study vaccine or has any plans to receive such treatment during the study.  no  6. Participant received or plans to receive:  f. Licensed live attenuated vaccines - within 28 days before or after planned administration of study vaccine.  g. Other licensed (not live) vaccines - within 14 days before or after planned administration of study vaccine.  no  7. Participant previously received a coronavirus vaccine.  no  8. Criterion modified per Amendment 1:  7.1 Participant received an investigational drug (including investigational drugs for prophylaxis of COVID-19, such as remdesivir) or used an invasive investigational medical device within 30 days or received an investigational vaccine (including investigational Adenoviral-vectored vaccines) within 6 months before the planned administration of the study vaccine or is currently enrolled or plans to participate in another investigational study during the course of this study. See also Section 6.8.    Note: Participation in an observational clinical study is allowed at the investigator's discretion; please notify the  sponsor (or medical monitor) of this decision.    Efforts will be made to ensure inclusion of participants who have not been previously enrolled in coronavirus studies and to prevent participants from subsequently enrolling in other coronavirus studies during their participation in this study.     The use of any coronavirus vaccine (licensed or investigational) other than Ad26.COV2.S is disallowed at any time prior to vaccination (see also Exclusion Criterion 6) and during the study, except under the conditions described in Section 6.6.  no  8. Criterion modified per Amendment 1:   8.1 Participant is pregnant or planning to become pregnant within 3 months after study vaccine administration.   N/A  9. Participant has a history of an underlying clinically significant acute or chronic medical condition or physical examination findings for which, in the opinion of the investigator, participation would not be in the best interest of the participant (eg, compromise the wellbeing) or that could prevent, limit, or confound the protocol-specified assessments.  no  10. Participant has a contraindication to IM injections and blood draws, eg, bleeding disorders.  no  11. Criterion deleted per Amendment 1:  12. Criterion modified per Amendment 1:  12.1 Participant has had major psychiatric illness which in the investigator's opinion would compromise the participant's safety or compliance with the study procedures.  no  13. Participant cannot communicate reliably with the investigator.  no  14. Participant who, in the opinion of the investigator, is unlikely to adhere to the requirements of the study, or is unlikely to complete the full course of vaccination and observation.  no  15. Criterion modified per Amendment 1:  15.1 Stages 1a and 2a: Participants with comorbidities that are or might be associated with an increased risk of progression to severe COVID-19a,13, ie, participants with moderate to severe asthma; chronic lung  diseases such as chronic obstructive pulmonary disease (COPD) (including emphysema and chronic bronchitis), idiopathic pulmonary fibrosis and cystic fibrosis; diabetes (including type 1, type 2, or gestational); serious heart conditions, including heart failure, coronary artery disease, congenital heart disease, cardiomyopathies, and pulmonary hypertension; moderate to severe high blood pressure; obesity (body mass index [BMI] ?30 kg/m2); chronic liver disease, including cirrhosis; sickle cell disease; thalassemia; cerebrovascular disease; neurologic conditions (dementia); end stage renal disease; organ transplantation; cancer; uncontrolled HIV infection and other immunodeficiencies; hepatitis B infection; sleep apnea; Parkinson's disease; seizures; ischemic strokes; Intracranial hemorrhage; Guillain-Barré syndrome; encephalopathy; meningoencephalitis; and participants who live in nursing homes or long-term care facilities.  No  16. Stages 1a and 2a: Participant has a history of malignancy within 1 year before screening (exceptions are squamous and basal cell carcinomas of the skin and carcinoma in situ of the cervix, or other malignancies with minimal risk of recurrence).  No  17. Stages 1a and 2a: Participant has a history of acute polyneuropathy (eg, Guillain-Barré syndrome).  No  18. Stages 1a and 2a: Participant had surgery requiring hospitalization (defined as inpatient stay for longer than 24 hours or overnight stay), within 12 weeks before vaccination, or will not have fully recovered from surgery requiring hospitalization, or has surgery requiring hospitalization planned during the time the participant is expected to participate in the study or within 6 months after study vaccine administration.  No  19. Stages 1a and 2a: Participant has chronic active hepatitis B or hepatitis C infection per medical history.  No      Were all eligibility criteria met?: yes  If no, enter information about main criteria not  satisfied/met: N/A    I have discussed with the patient requirement of acceptable methods of contraception for those participating in this study. Their method of contraceptive is N/A. The subject is male.    I have discussed with the patient the lifestyle considerations they must be willing and able to adhere to during the study, including: prohibited and restricted therapy during the study, agreement to follow all requirements that must be met during the study as noted in the inclusion and exclusion criteria (eg, contraceptive requirements), and agreement to follow requirements for the electronic completion of the COVID-19 signs and symptoms surveillance questions in the electronic clinical outcome assessment (eCOA). They have agreed to these terms and will continue in the study.    Inclusion/Exclusion were reviewed with the patient by myself and confirmed by PI/Sub-I Dr. Yoel Egan.

## 2020-12-03 NOTE — TELEPHONE ENCOUNTER
Location Screening:    If answers yes to any of the following, schedule at [facility] ONLY. If No, OK for either location.    1. Is there a diagnosis of heart failure, severe heart valve disease (aortic stenosis) or mechanical valve? no  a. Is the Left Ventricle Ejection Fraction <30% ? no    2. Does the pt have pulmonary hypertension? no   a. Is pulmonary arterial pressure gradient >50mmHg? no   b. Is there evidence of right ventricular dysfunction? no    3. Does the pt have achalasia? no    4. Any history of negative reaction to anesthesia? no   a. Myasthenia gravis? no   b. Malignant hyperthermia? no   c. Other? not applicable    5. Is procedure for esophageal banding? no      COVID Screening    1. Have you had a fever in the last 7 days or have you used fever reducing medicines for a fever in the last 7 days?  no    2. Are you experiencing shortness of breath, cough, muscle aches, loss of taste or loss of smell?  no    If answered yes to questions 1 and 2, the patient must seek medical attention with their PCP.  Do not schedule their procedure.     3. Are you residing with anyone who has tested positive for Covid?  no    If answered yes to question 3, recommend 14 day self-quarantine from the date of relative's positive test and place special needs note in the depot.  Wait to schedule.     ENDO screening    1. Have you been admitted for cardiac, kidney or pulmonary causes to the hospital in the past 3 months? no   If yes, schedule an appointment with PCP before scheduling endoscopic procedure.     2. Have you had a stent placed in the last 12 months? no   If yes, for a screening visit, cancel and message the ordering provider.  The patient will need a new order when the time is appropriate.     3. Have you had a stroke or heart attack in the past 6 months? no   If yes, cancel and refer patient to ordering provider for clearance, also message ordering provider to inform.     4. Have you had any chest pain in the past  "3 months? no   If yes, Have you been evaluated by your PCP and/or cardiologist and it was determined to not be heart related? not applicable   If No, Pt needs to be seen by PCP or Cardiologist .  Pt can be scheduled once clearance obtained by either of those providers.     5. Do you take prescription weight loss medications?  no   If yes, must stop for 2 weeks prior to procedure.     6. Have you been diagnosed with diverticulitis within the past 3 months? no   If yes, must have been seen by GI within the last 3 months, if not schedule with GI GUNJAN.    If pt has been seen by GI, schedule procedure 8-12 weeks post antibiotic treatment.     7. Are you on Dialysis? no  If yes, schedule procedure for the day AFTER dialysis.  Appt time should be 9am or later, patient arrival time is 2 hours prior.  Nulytely or miralax prep for all patients with kidney disease.     8. Are you diabetic?  no   If yes, schedule morning appt. Advise pt to hold all diabetic meds day of procedure.     9. If pt is older than 80 years of age and HAS NOT been seen by GI or PCP within the last 6 months, needs appt with GI GUNJAN.   If pt has been seen by the GI provider or PCP within the past 6  months AND meets criteria, schedule procedure AND send message to the endoscopist.     10. Is patient on a "high risk" medication (blood thinner/antiplatelet agent)?  no   If yes, has cardiac clearance been obtained within the last 60 days? N/A   If no, a new clearance needs to be obtained.     Final Questions:    1.I have reviewed the last colonoscopy for recommendations regarding next procedure bowel prep.  no  2. I have reviewed medications and allergies.  yes  3. I have verified the pharmacy information and appropriate prep sent if needed. yes  4. Prep instructions have been mailed or sent to portal per patient request. yes        All schedulers will have ability to reach out to the ordering GI provider to clarify any issues.     "

## 2020-12-03 NOTE — PROGRESS NOTES
1132 0.5 ml Vaccine/Placebo given Im to left deltoid with 25 gauge 1 inch needle by LOLITA Mars Rn.  Observation time ended at 1147. No reactions noted.

## 2020-12-10 ENCOUNTER — PATIENT OUTREACH (OUTPATIENT)
Dept: ADMINISTRATIVE | Facility: OTHER | Age: 75
End: 2020-12-10

## 2020-12-10 NOTE — PROGRESS NOTES
There are no preventive care reminders to display for this patient.  Updates were requested from care everywhere.  Chart was reviewed for overdue Proactive [facility] Encounters (RADHA) topics (CRS, Breast Cancer Screening, Eye exam)  Health Maintenance has been updated.  LINKS immunization registry triggered.  Immunizations were reconciled.

## 2020-12-11 ENCOUNTER — CLINICAL SUPPORT (OUTPATIENT)
Dept: AUDIOLOGY | Facility: CLINIC | Age: 75
End: 2020-12-11
Payer: MEDICARE

## 2020-12-11 ENCOUNTER — OFFICE VISIT (OUTPATIENT)
Dept: OTOLARYNGOLOGY | Facility: CLINIC | Age: 75
End: 2020-12-11
Payer: MEDICARE

## 2020-12-11 VITALS
BODY MASS INDEX: 23.77 KG/M2 | SYSTOLIC BLOOD PRESSURE: 155 MMHG | HEART RATE: 59 BPM | HEIGHT: 69 IN | TEMPERATURE: 98 F | DIASTOLIC BLOOD PRESSURE: 82 MMHG | WEIGHT: 160.5 LBS

## 2020-12-11 DIAGNOSIS — H90.3 SENSORY HEARING LOSS, BILATERAL: Primary | ICD-10-CM

## 2020-12-11 DIAGNOSIS — H93.11 TINNITUS OF RIGHT EAR: Primary | ICD-10-CM

## 2020-12-11 PROCEDURE — 99213 OFFICE O/P EST LOW 20 MIN: CPT | Mod: PBBFAC | Performed by: OTOLARYNGOLOGY

## 2020-12-11 PROCEDURE — 92557 COMPREHENSIVE HEARING TEST: CPT | Mod: PBBFAC | Performed by: AUDIOLOGIST-HEARING AID FITTER

## 2020-12-11 PROCEDURE — 99213 PR OFFICE/OUTPT VISIT, EST, LEVL III, 20-29 MIN: ICD-10-PCS | Mod: S$PBB,,, | Performed by: OTOLARYNGOLOGY

## 2020-12-11 PROCEDURE — 99999 PR PBB SHADOW E&M-EST. PATIENT-LVL III: CPT | Mod: PBBFAC,,, | Performed by: OTOLARYNGOLOGY

## 2020-12-11 PROCEDURE — 99999 PR PBB SHADOW E&M-EST. PATIENT-LVL III: ICD-10-PCS | Mod: PBBFAC,,, | Performed by: OTOLARYNGOLOGY

## 2020-12-11 PROCEDURE — 92567 TYMPANOMETRY: CPT | Mod: PBBFAC | Performed by: AUDIOLOGIST-HEARING AID FITTER

## 2020-12-11 PROCEDURE — 99213 OFFICE O/P EST LOW 20 MIN: CPT | Mod: S$PBB,,, | Performed by: OTOLARYNGOLOGY

## 2020-12-11 NOTE — PROGRESS NOTES
"Subjective:   Patient: Trip Gomes [id number], :1945   Visit date:2020 10:47 AM    Chief Complaint:  Patient in with a complaint of ringing in the ears (Patient states if he does certain things it sounds like someone is crumbling tissue paper or water is in his ear. )    HPI:  Trip is a 75 y.o. male who is here for follow-up.     Prior workup with CTA and US carotid for right sided pulsatile tinnitus. Was worse last week.  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  Audio today was stable.  Normal tymps.     Review of Systems:  -     Allergic/Immunologic: has No Known Allergies..  -     Constitutional: Current temp: 97.9 °F (36.6 °C)    -     Past medical, Past surgical, Social and family history reviewed and updated in Epic.   Objective:     Physical Exam:  Vitals:  BP (!) 155/82   Pulse (!) 59   Temp 97.9 °F (36.6 °C)   Ht 5' 9" (1.753 m)   Wt 72.8 kg (160 lb 7.9 oz)   BMI 23.70 kg/m²   Appearance:  Well-developed, well-nourished.  Communication:  Able to communicate, no hoarseness.  Head & Face:  Normocephalic, atraumatic, no sinus tenderness, normal facial strength.  Eyes:  Extraocular motions intact.  Ears:  Otoscopy of external auditory canals and tympanic membranes was normal, clinical speech reception thresholds grossly intact, no mass/lesion of auricle.  Nose:  No masses/lesions of external nose, nasal mucosa, septum, and turbinates were within normal limits.  Mouth:  No mass/lesion of lips, teeth, gums, hard/soft palate, tongue, tonsils, or oropharynx.  Neck & Lymphatics:  No cervical lymphadenopathy, no neck mass/crepitus/ asymmetry, trachea is midline, no thyroid enlargement/tenderness/mass.  Neuro/Psych: Alert with normal mood and affect.   Abdominal: Normal appearance.   Respiration/Chest:  Symmetric expansion during respiration, normal respiratory effort.  Skin:  Warm and intact  Cardiovascular:  No peripheral vascular edema or varicosities.    Assessment & Plan: "   There are no diagnoses linked to this encounter.    Stable hearing.  Pulsatile nature possibly related to dominant vertebral artery on the right.  Mild asymmetry on the left stable since 2018.  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  PRN.

## 2020-12-11 NOTE — PROGRESS NOTES
Referring Provider: Nilay Dominique Mireya was seen 12/11/2020 for an audiological evaluation.  Patient complains of ringing in the ears (Patient states if he does certain things it sounds like someone is crumbling tissue paper or water is in his ear. )    Results reveal a normal-to-severe sensorineural hearing loss 250-8000 Hz for the right ear, and a normal-to-profound sensorineural hearing loss 250-8000 Hz for the left ear.   Speech Reception Thresholds were  10 dBHL for the right ear and 10 dBHL for the left ear.   Word recognition scores were excellent for the right ear and excellent for the left ear.   Tympanograms were Type A for the right ear and Type A for the left ear.    No significant change from 4/9/18 audiogram.    Otoscopy was unremarkable bilaterally.     Patient was counseled on the above findings.    Recommendations:  1. ENT review - appointment with Dr. Pemberton today   2. Annual Audiograms

## 2020-12-21 ENCOUNTER — PATIENT MESSAGE (OUTPATIENT)
Dept: OTHER | Facility: OTHER | Age: 75
End: 2020-12-21

## 2020-12-21 NOTE — PROGRESS NOTES
Patient returned previous attempted outreaches. He left a voice message on Friday, 12/18/2020, when out of the office.     He reports being very pleased with BP readings. He states that he is doing well. Denies questions or concerns at this time. He states that he appreciated the follow-up calls and wished his Care Team a Merry Erica and Happy Holidays!     Sending [employer] message confirming that I have received VM.   Will f/u as scheduled.

## 2020-12-28 ENCOUNTER — TELEPHONE (OUTPATIENT)
Dept: HEMATOLOGY/ONCOLOGY | Facility: CLINIC | Age: 75
End: 2020-12-28

## 2020-12-29 ENCOUNTER — LAB VISIT (OUTPATIENT)
Dept: LAB | Facility: HOSPITAL | Age: 75
End: 2020-12-29
Attending: INTERNAL MEDICINE
Payer: MEDICARE

## 2020-12-29 ENCOUNTER — RESEARCH ENCOUNTER (OUTPATIENT)
Dept: RESEARCH | Facility: CLINIC | Age: 75
End: 2020-12-29
Payer: MEDICARE

## 2020-12-29 DIAGNOSIS — Z00.6 RESEARCH STUDY PATIENT: ICD-10-CM

## 2020-12-29 LAB
DRUG STUDY TEST NAME: NORMAL
DRUG STUDY TEST RESULT: NORMAL

## 2020-12-29 PROCEDURE — 36415 COLL VENOUS BLD VENIPUNCTURE: CPT

## 2020-12-29 NOTE — RESEARCH
Day 29 Visit  Sponsor: [employer]   Study: A Randomized, Double-blind, Placebo-controlled Phase 3 Study to Assess the Efficacy and Safety of Ad26.COV2.S for the Prevention of SARS-CoV-2-mediated COVID-19 in Adults Aged 18 Years and Older   IRB/Protocol #: 2020.275/ [id number]; Phase 3?   Principle Investigator/Sub-Investigator: Dr. Maile Trevino  Site Number: [id number]  Location: [place]  Subject Number: [id number]    Date of Visit:   Subject Status: Continuing  Visit Date: 29DEC2020  Type of Contact: Site Visit    Concomitant Medications:  Has patient had a change in concomitant medications since Day 1 Visit?: no    Adverse Events:  Has patient experienced any adverse events since Day 1 Visit?: no    Body Temperature:  Was body temperature collected?: yes  Collected by: Luis F Borges  Date of collection: 29DEC2020   Time of collection: 0828  Temperature: 97.1 F  Temperature Anatomical Location: Oral     Blood Sample Collection for Biomarker (RNA seq):  Was the sample collected?: yes  Sample collected by: Luis F Borges  Date of collection: 29DEC2020  Time of collection: 0828  Specimen Type: whole blood  mL collected: 2.5  Accession Number: [id number]    Blood Sample Collection for Humoral Immunogenicity:   Was the sample collected?: yes  Sample collected by: Luis F Borges  Date of collection: 29DEC2020  Time of collection: 0828  Specimen Type: Serum  mL collected: 10  Accession Number: [id number]    End of Visit:  Check-out completed by Luis F Borges  Visit 4 has been scheduled for 11FEB2021 at 0800. I have expressed importance of returning on scheduling visit date and time.   Patient has had funds added to their ClinCard and visit was updated in OnCore.   Patient has been advised to contact site if he experiences any major changes in health or has any study related questions.

## 2020-12-29 NOTE — PROGRESS NOTES
Day 29 Visit  Sponsor: [employer]   Study: A Randomized, Double-blind, Placebo-controlled Phase 3 Study to Assess the Efficacy and Safety of Ad26.COV2.S for the Prevention of SARS-CoV-2-mediated COVID-19 in Adults Aged 18 Years and Older   IRB/Protocol #: 2020.275/ [id number]; Phase 3?   Principle Investigator/Sub-Investigator: Dr. Maile Trevino  Site Number: [id number]  Location: [place]  Subject Number: [id number]     Date of Visit:   Subject Status: Continuing  Visit Date: 29DEC2020  Type of Contact: Site Visit     Concomitant Medications:  Has patient had a change in concomitant medications since Day 1 Visit?: no     Adverse Events:  Has patient experienced any adverse events since Day 1 Visit?: no     Body Temperature:  Was body temperature collected?: yes  Collected by: Luis F Borges  Date of collection: 29DEC2020            Time of collection: 0828  Temperature: 97.1 F  Temperature Anatomical Location: Oral      Blood Sample Collection for Biomarker (RNA seq):  Was the sample collected?: yes  Sample collected by: Luis F Borges  Date of collection: 29DEC2020  Time of collection: 0828  Specimen Type: whole blood  mL collected: 2.5  Accession Number: [id number]     Blood Sample Collection for Humoral Immunogenicity:   Was the sample collected?: yes  Sample collected by: Luis F Borges  Date of collection: 29DEC2020  Time of collection: 0828  Specimen Type: Serum  mL collected: 10  Accession Number: [id number]     End of Visit:  Check-out completed by Luis F Borges  Visit 4 has been scheduled for 11FEB2021 at 0800. I have expressed importance of returning on scheduling visit date and time.   Patient has had funds added to their ClinCard and visit was updated in OnCore.   Patient has been advised to contact site if he experiences any major changes in health or has any study related questions.

## 2020-12-31 ENCOUNTER — PATIENT MESSAGE (OUTPATIENT)
Dept: INTERNAL MEDICINE | Facility: CLINIC | Age: 75
End: 2020-12-31

## 2021-01-04 DIAGNOSIS — Z01.84 ANTIBODY RESPONSE EXAMINATION: Primary | ICD-10-CM

## 2021-01-05 ENCOUNTER — TELEPHONE (OUTPATIENT)
Dept: PREADMISSION TESTING | Facility: HOSPITAL | Age: 76
End: 2021-01-05

## 2021-01-06 ENCOUNTER — IMMUNIZATION (OUTPATIENT)
Dept: INTERNAL MEDICINE | Facility: CLINIC | Age: 76
End: 2021-01-06
Payer: MEDICARE

## 2021-01-06 ENCOUNTER — TELEPHONE (OUTPATIENT)
Dept: HEMATOLOGY/ONCOLOGY | Facility: CLINIC | Age: 76
End: 2021-01-06

## 2021-01-06 DIAGNOSIS — Z23 NEED FOR VACCINATION: ICD-10-CM

## 2021-01-06 PROCEDURE — 91300 COVID-19, MRNA, LNP-S, PF, 30 MCG/0.3 ML DOSE VACCINE: CPT | Mod: PBBFAC | Performed by: FAMILY MEDICINE

## 2021-01-07 ENCOUNTER — ANESTHESIA EVENT (OUTPATIENT)
Dept: ENDOSCOPY | Facility: HOSPITAL | Age: 76
End: 2021-01-07
Payer: MEDICARE

## 2021-01-09 ENCOUNTER — LAB VISIT (OUTPATIENT)
Dept: OTOLARYNGOLOGY | Facility: CLINIC | Age: 76
End: 2021-01-09
Payer: MEDICARE

## 2021-01-09 DIAGNOSIS — I70.0 AORTIC ATHEROSCLEROSIS: ICD-10-CM

## 2021-01-09 PROCEDURE — U0003 INFECTIOUS AGENT DETECTION BY NUCLEIC ACID (DNA OR RNA); SEVERE ACUTE RESPIRATORY SYNDROME CORONAVIRUS 2 (SARS-COV-2) (CORONAVIRUS DISEASE [COVID-19]), AMPLIFIED PROBE TECHNIQUE, MAKING USE OF HIGH THROUGHPUT TECHNOLOGIES AS DESCRIBED BY CMS-2020-01-R: HCPCS

## 2021-01-10 LAB — SARS-COV-2 RNA RESP QL NAA+PROBE: NOT DETECTED

## 2021-01-11 ENCOUNTER — TELEPHONE (OUTPATIENT)
Dept: ENDOSCOPY | Facility: HOSPITAL | Age: 76
End: 2021-01-11

## 2021-01-12 ENCOUNTER — HOSPITAL ENCOUNTER (OUTPATIENT)
Facility: HOSPITAL | Age: 76
Discharge: HOME | End: 2021-01-12
Attending: INTERNAL MEDICINE | Admitting: INTERNAL MEDICINE
Payer: MEDICARE

## 2021-01-12 ENCOUNTER — ANESTHESIA (OUTPATIENT)
Dept: ENDOSCOPY | Facility: HOSPITAL | Age: 76
End: 2021-01-12
Payer: MEDICARE

## 2021-01-12 VITALS
DIASTOLIC BLOOD PRESSURE: 78 MMHG | RESPIRATION RATE: 17 BRPM | HEIGHT: 69 IN | WEIGHT: 158 LBS | HEART RATE: 67 BPM | SYSTOLIC BLOOD PRESSURE: 138 MMHG | OXYGEN SATURATION: 97 % | TEMPERATURE: 98 F | BODY MASS INDEX: 23.4 KG/M2

## 2021-01-12 DIAGNOSIS — Z12.11 COLON CANCER SCREENING: Primary | ICD-10-CM

## 2021-01-12 PROCEDURE — 88305 TISSUE EXAM BY PATHOLOGIST: CPT | Mod: 26,,, | Performed by: PATHOLOGY

## 2021-01-12 PROCEDURE — 45380 COLONOSCOPY AND BIOPSY: CPT | Mod: 59,,, | Performed by: INTERNAL MEDICINE

## 2021-01-12 PROCEDURE — 63600175 PHARM REV CODE 636 W HCPCS: Performed by: NURSE ANESTHETIST, CERTIFIED REGISTERED

## 2021-01-12 PROCEDURE — 45385 COLONOSCOPY W/LESION REMOVAL: CPT | Performed by: INTERNAL MEDICINE

## 2021-01-12 PROCEDURE — 27201012 HC FORCEPS, HOT/COLD, DISP: Performed by: INTERNAL MEDICINE

## 2021-01-12 PROCEDURE — 45385 COLONOSCOPY W/LESION REMOVAL: CPT | Mod: PT,,, | Performed by: INTERNAL MEDICINE

## 2021-01-12 PROCEDURE — D9220A PRA ANESTHESIA: Mod: PT,CRNA,, | Performed by: NURSE ANESTHETIST, CERTIFIED REGISTERED

## 2021-01-12 PROCEDURE — 27201089 HC SNARE, DISP (ANY): Performed by: INTERNAL MEDICINE

## 2021-01-12 PROCEDURE — 63600175 PHARM REV CODE 636 W HCPCS: Performed by: INTERNAL MEDICINE

## 2021-01-12 PROCEDURE — 45380 COLONOSCOPY AND BIOPSY: CPT | Performed by: INTERNAL MEDICINE

## 2021-01-12 PROCEDURE — 88305 TISSUE EXAM BY PATHOLOGIST: ICD-10-PCS | Mod: 26,,, | Performed by: PATHOLOGY

## 2021-01-12 PROCEDURE — 88305 TISSUE EXAM BY PATHOLOGIST: CPT | Mod: 59 | Performed by: PATHOLOGY

## 2021-01-12 PROCEDURE — D9220A PRA ANESTHESIA: Mod: PT,ANES,, | Performed by: ANESTHESIOLOGY

## 2021-01-12 PROCEDURE — 45385 PR COLONOSCOPY,REMV LESN,SNARE: ICD-10-PCS | Mod: PT,,, | Performed by: INTERNAL MEDICINE

## 2021-01-12 PROCEDURE — 37000008 HC ANESTHESIA 1ST 15 MINUTES: Performed by: INTERNAL MEDICINE

## 2021-01-12 PROCEDURE — 45380 PR COLONOSCOPY,BIOPSY: ICD-10-PCS | Mod: 59,,, | Performed by: INTERNAL MEDICINE

## 2021-01-12 PROCEDURE — D9220A PRA ANESTHESIA: ICD-10-PCS | Mod: PT,CRNA,, | Performed by: NURSE ANESTHETIST, CERTIFIED REGISTERED

## 2021-01-12 PROCEDURE — D9220A PRA ANESTHESIA: ICD-10-PCS | Mod: PT,ANES,, | Performed by: ANESTHESIOLOGY

## 2021-01-12 PROCEDURE — 37000009 HC ANESTHESIA EA ADD 15 MINS: Performed by: INTERNAL MEDICINE

## 2021-01-12 PROCEDURE — 27200997: Performed by: INTERNAL MEDICINE

## 2021-01-12 RX ORDER — LIDOCAINE HCL/PF 100 MG/5ML
SYRINGE (ML) INTRAVENOUS
Status: DISCONTINUED | OUTPATIENT
Start: 2021-01-12 | End: 2021-01-12

## 2021-01-12 RX ORDER — PROPOFOL 10 MG/ML
VIAL (ML) INTRAVENOUS CONTINUOUS PRN
Status: DISCONTINUED | OUTPATIENT
Start: 2021-01-12 | End: 2021-01-12

## 2021-01-12 RX ORDER — SODIUM CHLORIDE, SODIUM LACTATE, POTASSIUM CHLORIDE, CALCIUM CHLORIDE 600; 310; 30; 20 MG/100ML; MG/100ML; MG/100ML; MG/100ML
INJECTION, SOLUTION INTRAVENOUS CONTINUOUS
Status: DISCONTINUED | OUTPATIENT
Start: 2021-01-12 | End: 2021-01-12 | Stop reason: HOSPADM

## 2021-01-12 RX ORDER — PROPOFOL 10 MG/ML
VIAL (ML) INTRAVENOUS
Status: DISCONTINUED | OUTPATIENT
Start: 2021-01-12 | End: 2021-01-12

## 2021-01-12 RX ADMIN — Medication 100 MG: at 08:01

## 2021-01-12 RX ADMIN — SODIUM CHLORIDE, SODIUM LACTATE, POTASSIUM CHLORIDE, AND CALCIUM CHLORIDE: 600; 310; 30; 20 INJECTION, SOLUTION INTRAVENOUS at 08:01

## 2021-01-12 RX ADMIN — PROPOFOL 150 MCG/KG/MIN: 10 INJECTION, EMULSION INTRAVENOUS at 08:01

## 2021-01-12 RX ADMIN — PROPOFOL 60 MG: 10 INJECTION, EMULSION INTRAVENOUS at 08:01

## 2021-01-14 ENCOUNTER — PATIENT MESSAGE (OUTPATIENT)
Dept: OTHER | Facility: OTHER | Age: 76
End: 2021-01-14

## 2021-01-19 LAB
FINAL PATHOLOGIC DIAGNOSIS: NORMAL
GROSS: NORMAL
Lab: NORMAL

## 2021-01-27 ENCOUNTER — IMMUNIZATION (OUTPATIENT)
Dept: INTERNAL MEDICINE | Facility: CLINIC | Age: 76
End: 2021-01-27
Payer: MEDICARE

## 2021-01-27 DIAGNOSIS — Z23 NEED FOR VACCINATION: Primary | ICD-10-CM

## 2021-01-27 PROCEDURE — 91300 COVID-19, MRNA, LNP-S, PF, 30 MCG/0.3 ML DOSE VACCINE: CPT | Mod: PBBFAC | Performed by: FAMILY MEDICINE

## 2021-01-27 PROCEDURE — 0002A COVID-19, MRNA, LNP-S, PF, 30 MCG/0.3 ML DOSE VACCINE: CPT | Mod: PBBFAC | Performed by: FAMILY MEDICINE

## 2021-02-11 ENCOUNTER — LAB VISIT (OUTPATIENT)
Dept: LAB | Facility: HOSPITAL | Age: 76
End: 2021-02-11
Attending: INTERNAL MEDICINE
Payer: MEDICARE

## 2021-02-11 ENCOUNTER — RESEARCH ENCOUNTER (OUTPATIENT)
Dept: RESEARCH | Facility: CLINIC | Age: 76
End: 2021-02-11
Payer: MEDICARE

## 2021-02-11 DIAGNOSIS — Z00.6 RESEARCH STUDY PATIENT: ICD-10-CM

## 2021-02-11 LAB
DRUG STUDY TEST NAME: NORMAL
DRUG STUDY TEST RESULT: NORMAL

## 2021-02-11 PROCEDURE — 36415 COLL VENOUS BLD VENIPUNCTURE: CPT

## 2021-02-18 ENCOUNTER — PES CALL (OUTPATIENT)
Dept: ADMINISTRATIVE | Facility: CLINIC | Age: 76
End: 2021-02-18

## 2021-02-25 ENCOUNTER — PATIENT MESSAGE (OUTPATIENT)
Dept: OTHER | Facility: OTHER | Age: 76
End: 2021-02-25

## 2021-03-02 ENCOUNTER — PATIENT MESSAGE (OUTPATIENT)
Dept: ADMINISTRATIVE | Facility: OTHER | Age: 76
End: 2021-03-02

## 2021-04-08 ENCOUNTER — PES CALL (OUTPATIENT)
Dept: ADMINISTRATIVE | Facility: CLINIC | Age: 76
End: 2021-04-08

## 2021-04-20 ENCOUNTER — RESEARCH ENCOUNTER (OUTPATIENT)
Dept: RESEARCH | Facility: CLINIC | Age: 76
End: 2021-04-20
Payer: MEDICARE

## 2021-04-20 ENCOUNTER — LAB VISIT (OUTPATIENT)
Dept: LAB | Facility: HOSPITAL | Age: 76
End: 2021-04-20
Attending: INTERNAL MEDICINE
Payer: MEDICARE

## 2021-04-20 DIAGNOSIS — Z00.6 RESEARCH STUDY PATIENT: ICD-10-CM

## 2021-04-20 LAB
DRUG STUDY TEST NAME: NORMAL
DRUG STUDY TEST RESULT: NORMAL

## 2021-04-20 PROCEDURE — 36415 COLL VENOUS BLD VENIPUNCTURE: CPT | Performed by: INTERNAL MEDICINE

## 2021-04-23 ENCOUNTER — PATIENT MESSAGE (OUTPATIENT)
Dept: OTHER | Facility: OTHER | Age: 76
End: 2021-04-23

## 2021-04-29 ENCOUNTER — PES CALL (OUTPATIENT)
Dept: ADMINISTRATIVE | Facility: CLINIC | Age: 76
End: 2021-04-29

## 2021-05-13 ENCOUNTER — TELEPHONE (OUTPATIENT)
Dept: ADMINISTRATIVE | Facility: HOSPITAL | Age: 76
End: 2021-05-13

## 2021-05-26 ENCOUNTER — TELEPHONE (OUTPATIENT)
Dept: ADMINISTRATIVE | Facility: HOSPITAL | Age: 76
End: 2021-05-26

## 2021-07-14 ENCOUNTER — PES CALL (OUTPATIENT)
Dept: ADMINISTRATIVE | Facility: CLINIC | Age: 76
End: 2021-07-14

## 2021-07-26 ENCOUNTER — TELEPHONE (OUTPATIENT)
Dept: RESEARCH | Facility: CLINIC | Age: 76
End: 2021-07-26

## 2021-07-27 ENCOUNTER — TELEPHONE (OUTPATIENT)
Dept: ADMINISTRATIVE | Facility: HOSPITAL | Age: 76
End: 2021-07-27

## 2021-07-29 ENCOUNTER — RESEARCH ENCOUNTER (OUTPATIENT)
Dept: RESEARCH | Facility: CLINIC | Age: 76
End: 2021-07-29
Payer: MEDICARE

## 2021-08-29 ENCOUNTER — PATIENT MESSAGE (OUTPATIENT)
Dept: ADMINISTRATIVE | Facility: OTHER | Age: 76
End: 2021-08-29

## 2021-09-22 PROBLEM — Z12.11 COLON CANCER SCREENING: Status: RESOLVED | Noted: 2021-01-12 | Resolved: 2021-09-22

## 2021-09-24 ENCOUNTER — OFFICE VISIT (OUTPATIENT)
Dept: PRIMARY CARE CLINIC | Facility: CLINIC | Age: 76
End: 2021-09-24
Payer: MEDICARE

## 2021-09-24 VITALS
DIASTOLIC BLOOD PRESSURE: 67 MMHG | SYSTOLIC BLOOD PRESSURE: 114 MMHG | BODY MASS INDEX: 24.24 KG/M2 | WEIGHT: 163.69 LBS | HEIGHT: 69 IN

## 2021-09-24 DIAGNOSIS — I70.0 AORTIC ATHEROSCLEROSIS: ICD-10-CM

## 2021-09-24 DIAGNOSIS — Z85.828 HISTORY OF SKIN CANCER: ICD-10-CM

## 2021-09-24 DIAGNOSIS — Z00.00 ENCOUNTER FOR PREVENTIVE HEALTH EXAMINATION: Primary | ICD-10-CM

## 2021-09-24 DIAGNOSIS — R20.2 PARESTHESIA OF BOTH FEET: ICD-10-CM

## 2021-09-24 DIAGNOSIS — E78.00 HYPERCHOLESTEREMIA: ICD-10-CM

## 2021-09-24 DIAGNOSIS — I10 ESSENTIAL HYPERTENSION: ICD-10-CM

## 2021-09-24 DIAGNOSIS — N40.0 BENIGN PROSTATIC HYPERPLASIA WITHOUT LOWER URINARY TRACT SYMPTOMS: ICD-10-CM

## 2021-09-24 DIAGNOSIS — I51.89 GRADE I DIASTOLIC DYSFUNCTION: ICD-10-CM

## 2021-09-24 DIAGNOSIS — M15.9 PRIMARY OSTEOARTHRITIS INVOLVING MULTIPLE JOINTS: ICD-10-CM

## 2021-09-24 PROCEDURE — 99999 PR PBB SHADOW E&M-EST. PATIENT-LVL III: CPT | Mod: PBBFAC,,, | Performed by: PHYSICIAN ASSISTANT

## 2021-09-24 PROCEDURE — 99999 PR PBB SHADOW E&M-EST. PATIENT-LVL III: ICD-10-PCS | Mod: PBBFAC,,, | Performed by: PHYSICIAN ASSISTANT

## 2021-09-24 PROCEDURE — 99213 OFFICE O/P EST LOW 20 MIN: CPT | Mod: PBBFAC,PN | Performed by: PHYSICIAN ASSISTANT

## 2021-09-24 PROCEDURE — G0439 PR MEDICARE ANNUAL WELLNESS SUBSEQUENT VISIT: ICD-10-PCS | Mod: ,,, | Performed by: PHYSICIAN ASSISTANT

## 2021-09-24 PROCEDURE — G0439 PPPS, SUBSEQ VISIT: HCPCS | Mod: ,,, | Performed by: PHYSICIAN ASSISTANT

## 2021-09-24 RX ORDER — BRIMONIDINE TARTRATE, TIMOLOL MALEATE 2; 5 MG/ML; MG/ML
1 SOLUTION/ DROPS OPHTHALMIC DAILY
COMMUNITY
Start: 2021-09-13 | End: 2022-10-28

## 2021-10-03 ENCOUNTER — IMMUNIZATION (OUTPATIENT)
Dept: PRIMARY CARE CLINIC | Facility: CLINIC | Age: 76
End: 2021-10-03
Payer: MEDICARE

## 2021-10-03 DIAGNOSIS — Z23 NEED FOR VACCINATION: Primary | ICD-10-CM

## 2021-10-03 PROCEDURE — 0003A COVID-19, MRNA, LNP-S, PF, 30 MCG/0.3 ML DOSE VACCINE: CPT | Mod: CV19,PBBFAC | Performed by: FAMILY MEDICINE

## 2021-10-03 PROCEDURE — 91300 COVID-19, MRNA, LNP-S, PF, 30 MCG/0.3 ML DOSE VACCINE: CPT | Mod: PBBFAC | Performed by: FAMILY MEDICINE

## 2021-10-30 ENCOUNTER — IMMUNIZATION (OUTPATIENT)
Dept: INTERNAL MEDICINE | Facility: CLINIC | Age: 76
End: 2021-10-30
Payer: MEDICARE

## 2021-10-30 PROCEDURE — G0008 ADMIN INFLUENZA VIRUS VAC: HCPCS | Mod: PBBFAC

## 2021-10-30 PROCEDURE — 90694 VACC AIIV4 NO PRSRV 0.5ML IM: CPT | Mod: PBBFAC

## 2021-11-10 ENCOUNTER — TELEPHONE (OUTPATIENT)
Dept: RESEARCH | Facility: HOSPITAL | Age: 76
End: 2021-11-10
Payer: MEDICARE

## 2021-11-26 ENCOUNTER — LAB VISIT (OUTPATIENT)
Dept: LAB | Facility: HOSPITAL | Age: 76
End: 2021-11-26
Attending: FAMILY MEDICINE
Payer: MEDICARE

## 2021-11-26 DIAGNOSIS — I10 ESSENTIAL HYPERTENSION: ICD-10-CM

## 2021-11-26 DIAGNOSIS — Z12.5 SCREENING FOR PROSTATE CANCER: ICD-10-CM

## 2021-11-26 LAB
ALBUMIN SERPL BCP-MCNC: 3.9 G/DL (ref 3.5–5.2)
ALP SERPL-CCNC: 73 U/L (ref 55–135)
ALT SERPL W/O P-5'-P-CCNC: 31 U/L (ref 10–44)
ANION GAP SERPL CALC-SCNC: 5 MMOL/L (ref 8–16)
AST SERPL-CCNC: 31 U/L (ref 10–40)
BILIRUB SERPL-MCNC: 0.5 MG/DL (ref 0.1–1)
BUN SERPL-MCNC: 16 MG/DL (ref 8–23)
CALCIUM SERPL-MCNC: 9.6 MG/DL (ref 8.7–10.5)
CHLORIDE SERPL-SCNC: 108 MMOL/L (ref 95–110)
CHOLEST SERPL-MCNC: 200 MG/DL (ref 120–199)
CHOLEST/HDLC SERPL: 2.9 {RATIO} (ref 2–5)
CO2 SERPL-SCNC: 28 MMOL/L (ref 23–29)
COMPLEXED PSA SERPL-MCNC: 0.74 NG/ML (ref 0–4)
CREAT SERPL-MCNC: 0.9 MG/DL (ref 0.5–1.4)
EST. GFR  (AFRICAN AMERICAN): >60 ML/MIN/1.73 M^2
EST. GFR  (NON AFRICAN AMERICAN): >60 ML/MIN/1.73 M^2
GLUCOSE SERPL-MCNC: 95 MG/DL (ref 70–110)
HDLC SERPL-MCNC: 69 MG/DL (ref 40–75)
HDLC SERPL: 34.5 % (ref 20–50)
LDLC SERPL CALC-MCNC: 114.2 MG/DL (ref 63–159)
NONHDLC SERPL-MCNC: 131 MG/DL
POTASSIUM SERPL-SCNC: 4.4 MMOL/L (ref 3.5–5.1)
PROT SERPL-MCNC: 6.5 G/DL (ref 6–8.4)
SODIUM SERPL-SCNC: 141 MMOL/L (ref 136–145)
TRIGL SERPL-MCNC: 84 MG/DL (ref 30–150)

## 2021-11-26 PROCEDURE — 36415 COLL VENOUS BLD VENIPUNCTURE: CPT | Performed by: FAMILY MEDICINE

## 2021-11-26 PROCEDURE — 84153 ASSAY OF PSA TOTAL: CPT | Performed by: FAMILY MEDICINE

## 2021-11-26 PROCEDURE — 80061 LIPID PANEL: CPT | Performed by: FAMILY MEDICINE

## 2021-11-26 PROCEDURE — 80053 COMPREHEN METABOLIC PANEL: CPT | Performed by: FAMILY MEDICINE

## 2021-12-02 ENCOUNTER — OFFICE VISIT (OUTPATIENT)
Dept: INTERNAL MEDICINE | Facility: CLINIC | Age: 76
End: 2021-12-02
Payer: MEDICARE

## 2021-12-02 ENCOUNTER — LAB VISIT (OUTPATIENT)
Dept: LAB | Facility: HOSPITAL | Age: 76
End: 2021-12-02
Attending: INTERNAL MEDICINE
Payer: MEDICARE

## 2021-12-02 ENCOUNTER — RESEARCH ENCOUNTER (OUTPATIENT)
Dept: RESEARCH | Facility: CLINIC | Age: 76
End: 2021-12-02
Payer: MEDICARE

## 2021-12-02 VITALS
OXYGEN SATURATION: 99 % | BODY MASS INDEX: 23.87 KG/M2 | HEIGHT: 69 IN | DIASTOLIC BLOOD PRESSURE: 90 MMHG | WEIGHT: 161.19 LBS | SYSTOLIC BLOOD PRESSURE: 138 MMHG | HEART RATE: 68 BPM | TEMPERATURE: 98 F

## 2021-12-02 DIAGNOSIS — Z12.5 SCREENING FOR PROSTATE CANCER: ICD-10-CM

## 2021-12-02 DIAGNOSIS — R20.8 OTHER DISTURBANCES OF SKIN SENSATION: ICD-10-CM

## 2021-12-02 DIAGNOSIS — G57.93 UNSPECIFIED MONONEUROPATHY OF BILATERAL LOWER LIMBS: ICD-10-CM

## 2021-12-02 DIAGNOSIS — I10 ESSENTIAL HYPERTENSION: ICD-10-CM

## 2021-12-02 DIAGNOSIS — Z00.00 ROUTINE HEALTH MAINTENANCE: Primary | ICD-10-CM

## 2021-12-02 DIAGNOSIS — Z00.6 RESEARCH STUDY PATIENT: ICD-10-CM

## 2021-12-02 DIAGNOSIS — G62.9 NEUROPATHY: ICD-10-CM

## 2021-12-02 LAB
DRUG STUDY TEST NAME: NORMAL
DRUG STUDY TEST RESULT: NORMAL

## 2021-12-02 PROCEDURE — 99213 OFFICE O/P EST LOW 20 MIN: CPT | Mod: PBBFAC | Performed by: FAMILY MEDICINE

## 2021-12-02 PROCEDURE — 99397 PR PREVENTIVE VISIT,EST,65 & OVER: ICD-10-PCS | Mod: S$PBB,,, | Performed by: FAMILY MEDICINE

## 2021-12-02 PROCEDURE — 99397 PER PM REEVAL EST PAT 65+ YR: CPT | Mod: S$PBB,,, | Performed by: FAMILY MEDICINE

## 2021-12-02 PROCEDURE — 99999 PR PBB SHADOW E&M-EST. PATIENT-LVL III: CPT | Mod: PBBFAC,,, | Performed by: FAMILY MEDICINE

## 2021-12-02 PROCEDURE — 99000 SPECIMEN HANDLING OFFICE-LAB: CPT | Performed by: INTERNAL MEDICINE

## 2021-12-02 PROCEDURE — 99999 PR PBB SHADOW E&M-EST. PATIENT-LVL III: ICD-10-PCS | Mod: PBBFAC,,, | Performed by: FAMILY MEDICINE

## 2021-12-07 ENCOUNTER — TELEPHONE (OUTPATIENT)
Dept: INTERNAL MEDICINE | Facility: CLINIC | Age: 76
End: 2021-12-07
Payer: MEDICARE

## 2021-12-09 ENCOUNTER — LAB VISIT (OUTPATIENT)
Dept: LAB | Facility: HOSPITAL | Age: 76
End: 2021-12-09
Attending: FAMILY MEDICINE
Payer: MEDICARE

## 2021-12-09 DIAGNOSIS — R20.8 OTHER DISTURBANCES OF SKIN SENSATION: ICD-10-CM

## 2021-12-09 DIAGNOSIS — G57.93 UNSPECIFIED MONONEUROPATHY OF BILATERAL LOWER LIMBS: ICD-10-CM

## 2021-12-09 DIAGNOSIS — G62.9 NEUROPATHY: ICD-10-CM

## 2021-12-09 LAB
TSH SERPL DL<=0.005 MIU/L-ACNC: 1.68 UIU/ML (ref 0.4–4)
VIT B12 SERPL-MCNC: 297 PG/ML (ref 210–950)

## 2021-12-09 PROCEDURE — 36415 COLL VENOUS BLD VENIPUNCTURE: CPT | Performed by: FAMILY MEDICINE

## 2021-12-09 PROCEDURE — 84443 ASSAY THYROID STIM HORMONE: CPT | Performed by: FAMILY MEDICINE

## 2021-12-09 PROCEDURE — 82607 VITAMIN B-12: CPT | Performed by: FAMILY MEDICINE

## 2022-02-08 ENCOUNTER — TELEPHONE (OUTPATIENT)
Dept: INTERNAL MEDICINE | Facility: CLINIC | Age: 77
End: 2022-02-08
Payer: MEDICARE

## 2022-02-08 ENCOUNTER — PATIENT OUTREACH (OUTPATIENT)
Dept: ADMINISTRATIVE | Facility: HOSPITAL | Age: 77
End: 2022-02-08
Payer: MEDICARE

## 2022-02-08 NOTE — PROGRESS NOTES
HTN Report: Patient is active on the [employer] [facility] HTN program. Patient BP on 2/5/22 was 135/79.

## 2022-02-25 ENCOUNTER — PATIENT MESSAGE (OUTPATIENT)
Dept: ADMINISTRATIVE | Facility: OTHER | Age: 77
End: 2022-02-25
Payer: MEDICARE

## 2022-03-24 ENCOUNTER — TELEPHONE (OUTPATIENT)
Dept: RESEARCH | Facility: HOSPITAL | Age: 77
End: 2022-03-24
Payer: MEDICARE

## 2022-04-05 ENCOUNTER — IMMUNIZATION (OUTPATIENT)
Dept: PHARMACY | Facility: CLINIC | Age: 77
End: 2022-04-05
Payer: MEDICARE

## 2022-04-05 DIAGNOSIS — Z23 NEED FOR VACCINATION: Primary | ICD-10-CM

## 2022-05-10 ENCOUNTER — RESEARCH ENCOUNTER (OUTPATIENT)
Dept: RESEARCH | Facility: CLINIC | Age: 77
End: 2022-05-10
Payer: MEDICARE

## 2022-05-10 ENCOUNTER — LAB VISIT (OUTPATIENT)
Dept: LAB | Facility: HOSPITAL | Age: 77
End: 2022-05-10
Attending: INTERNAL MEDICINE
Payer: MEDICARE

## 2022-05-10 DIAGNOSIS — Z00.6 RESEARCH STUDY PATIENT: ICD-10-CM

## 2022-05-10 LAB
DRUG STUDY TEST NAME: NORMAL
DRUG STUDY TEST RESULT: NORMAL

## 2022-05-10 PROCEDURE — 36415 COLL VENOUS BLD VENIPUNCTURE: CPT | Performed by: INTERNAL MEDICINE

## 2022-05-10 NOTE — PROGRESS NOTES
Julio C Month 18  Sponsor: [employer]   Study: A Randomized, Double-blind, Placebo-controlled Phase 3 Study to Assess the Efficacy and Safety of Ad26.COV2.S for the Prevention of SARS-CoV-2-mediated COVID-19 in Adults Aged 18 Years and Older   IRB/Protocol #: 2020.275/ [id number]; Phase 3?   Principle Investigator/Sub-Investigator: Dr. Yoel Egan  Site Number: [id number]  Location: [place]  Subject Number: [id number]    Does subject need to be re consented at this time? yes   .Informed Consent:   Is this a reconsent?: yes  Consenting was completed in private area using the most current IRB approved version of the ICF dated 23-FEB-2022  by myself and patient was given ample time to review consent prior to execution of ICF.    Prior to the Informed Consent (IC) being signed, or any study protocol required data collection, testing, procedure, or intervention being performed, the following was done and/or discussed:?    Patient was given a copy of the IC for review??    Purpose of the study and qualifications to participate??    Study design, follow up schedule, and tests or procedures done at each visit?    Confidentiality and HIPAA Authorization for Release of Medical Records for the research trial/ subject's rights/research related injury?    Risk, Benefits, Alternative Treatments, Compensation and Costs?    Participation in the research trial is voluntary and patient may withdraw at anytime?    Contact information for study related questions?     Patient verbalizes understanding of the above: Yes?   Contact information for CRC and PI given to patient: Yes?   Patient able to adequately summarize: the purpose of the study, the risks associated with the study, and all procedures, testing, and follow-ups associated with the study: Yes?     Trip Gomes signed the IRB approved version of informed consent form for the [employer] research study.?  Each page of the consent was reviewed with the patient and patient's family) and all questions answered satisfactorily. The patient signed the paper consent form and received a copy of same (email containing consent sent to patient). The original consent was scanned into electronic medical records (EPIC).    Concomitant Medications:  Has patient had a change in concomitant medications since prior Visit?: yes    Concomitant Medications:  Medication or Therapy Name: Pfizer Covid vaccine (booster)  Indication: Prophylaxis  If AE, enter corresponding AE log line, start date, and term: N/A  If MH, enter corresponding MH log line, start date, and term: N/A  If indicated specification is 'Prophylaxis', 'Other', or 'Solicited Adverse Event', specify: covid 19 booster vaccine  Dose: 0.3  Dose unit: Milliliter  Dose Form: Injectable  Route: Intramuscular  Frequency: Once  Start Date: 05APR2022  onging?: no  End Date: 05APR2022      Adverse Events:  Has patient experienced any adverse events since prior Visit?: no      Has the subject required any medical encounters other than those mandated per trial protocol?: yes    If yes, complete data below:  Collection Date: 10MAY2022  Start Date: 10DEC2021  End Date: 10DEC2021  Reason for medical encounter: Other: ocular hypertention  Type of medical encounter: Medical Practitioner Office: A consultation with the physician/ other medical practitioner at the office of the health care professional    If Hospital Outpatient Department, Laboratory Department, Medical Practitioner Office or Home Care is selected, specify frequency of visits: Once    If Hospital Outpatient Department is selected, specify type of practitioner: Not Applicable       Body Temperature:  Was body temperature collected?: yes  Collected by: Angeles Sebastian  Date of collection: 10MAY2022  Time of collection: 0846  Temperature: 97.5 F  Temperature Anatomical Location: Forehead      Blood Sample Collection for Humoral  Immunogenicity:   Was the sample collected?: Yes  Sample collected by: lab  Date of collection: 10May2022  Time of collection: 0847  Specimen Type: Serum  mL collected: 10 (NON-immuno subset) (15 for homologous subset and immuno subset only)  Accession Number: [id number]      Next Visit:  Was Year 2 scheduled? yes   If yes, when? [id number] at 830      I have expressed importance of returning on scheduling visit date and time. Patient has had funds added to their ClinCard and visit was updated in OnCore.   Subject was instructed to complete eDiary twice weekly for the remainder of the study.

## 2022-08-01 ENCOUNTER — TELEPHONE (OUTPATIENT)
Dept: INTERNAL MEDICINE | Facility: CLINIC | Age: 77
End: 2022-08-01
Payer: MEDICARE

## 2022-08-01 ENCOUNTER — PATIENT OUTREACH (OUTPATIENT)
Dept: ADMINISTRATIVE | Facility: HOSPITAL | Age: 77
End: 2022-08-01
Payer: MEDICARE

## 2022-08-01 VITALS — SYSTOLIC BLOOD PRESSURE: 118 MMHG | DIASTOLIC BLOOD PRESSURE: 67 MMHG

## 2022-08-01 NOTE — TELEPHONE ENCOUNTER
Working HTN Reprt.     Pt is enrolled in [facility] HT[facility] Program.  7/29/22 /67, remote entry made.     Pt has fu/anual scheduled, 12/08/22.

## 2022-08-24 ENCOUNTER — PATIENT MESSAGE (OUTPATIENT)
Dept: ADMINISTRATIVE | Facility: OTHER | Age: 77
End: 2022-08-24
Payer: MEDICARE

## 2022-09-16 ENCOUNTER — DOCUMENTATION ONLY (OUTPATIENT)
Dept: RESEARCH | Facility: HOSPITAL | Age: 77
End: 2022-09-16
Payer: MEDICARE

## 2022-09-16 NOTE — PROGRESS NOTES
Sponsor: [employer]   Study: A Randomized, Double-blind, Placebo-controlled Phase 3 Study to Assess the Efficacy and Safety of Ad26.COV2.S for the Prevention of SARS-CoV-2-mediated COVID-19 in Adults Aged 18 Years and Older   IRB/Protocol #: 2020.275/ [id number]; Phase 3?   Principle Investigator/Sub-Investigator: Dr. Yoel Egan  Site Number: [id number]  Location: [place]  Subject Number: [id number]    Protocol Amendment 7 Contact    Site contacted participant to schedule Re-Consent Visit for Protocol Amendment 7 and to discuss significant changes to the protocol outlined in participant letter dated 10MAY2022. Participant did not answer, but at message was left with contact information.    Participant scheduled for Re-consent Visit? no   Re-Consent Visit scheduled for: n/a

## 2022-09-20 ENCOUNTER — RESEARCH ENCOUNTER (OUTPATIENT)
Dept: RESEARCH | Facility: HOSPITAL | Age: 77
End: 2022-09-20
Payer: MEDICARE

## 2022-09-20 NOTE — PROGRESS NOTES
Sponsor: [employer]   Study: A Randomized, Double-blind, Placebo-controlled Phase 3 Study to Assess the Efficacy and Safety of Ad26.COV2.S for the Prevention of SARS-CoV-2-mediated COVID-19 in Adults Aged 18 Years and Older   IRB/Protocol #: 2020.275/ [id number]; Phase 3?   Principle Investigator/Sub-Investigator: Dr. Yoel Egan  Site Number: [id number]  Location: [place]  Subject Number: [id number]      Protocol Amendment 7 - Unscheduled Re-Consent Visit    Informed Consent:   Is this a reconsent?: yes  Consenting was completed in private area using the most current IRB approved version of the ICF dated 02-AUG-2022 by myself and patient was given ample time to review consent prior to execution of ICF.    Prior to the Informed Consent (IC) being signed, or any study protocol required data collection, testing, procedure, or intervention being performed, the following was done and/or discussed:?   Patient was given a copy of the IC for review??   Purpose of the study and qualifications to participate??   Study design, follow up schedule, and tests or procedures done at each visit?   Confidentiality and HIPAA Authorization for Release of Medical Records for the research trial/ subject's rights/research related injury?   Risk, Benefits, Alternative Treatments, Compensation and Costs?   Participation in the research trial is voluntary and patient may withdraw at anytime?   Contact information for study related questions?     Patient verbalizes understanding of the above: Yes?   Contact information for CRC and PI given to patient: Yes?   Patient able to adequately summarize: the purpose of the study, the risks associated with the study, and all procedures, testing, and follow-ups associated with the study: Yes?     Trip Gomes signed the IRB approved version of informed consent form for the [employer] research study.? Each page of the consent was  reviewed with the patient and patient's family) and all questions answered satisfactorily. The patient signed the paper consent form and received a copy of same (copy of informed consent made and provided to patient). The original consent was scanned into electronic medical records (EPIC).      Next Visit:  Is subject enrolled in the immunogenicity or booster subset? no  If yes, IN-PERSON Year 2 Visit scheduled? no  If yes, when? N/a    If subject is not in one of the above subsets Participant understands that Year 2 visit will be conducted via telephone and site will contact subject the day the window for that visit opens? yes    Subject instructed to delete the Study Hub robert or return provisional device to site. Subject has had funds added to their ClinCard and visit was updated in [facility].

## 2022-11-23 ENCOUNTER — IMMUNIZATION (OUTPATIENT)
Dept: INTERNAL MEDICINE | Facility: CLINIC | Age: 77
End: 2022-11-23
Payer: MEDICARE

## 2022-11-23 PROCEDURE — G0008 ADMIN INFLUENZA VIRUS VAC: HCPCS | Mod: PBBFAC

## 2022-12-01 ENCOUNTER — LAB VISIT (OUTPATIENT)
Dept: LAB | Facility: HOSPITAL | Age: 77
End: 2022-12-01
Attending: FAMILY MEDICINE
Payer: MEDICARE

## 2022-12-01 DIAGNOSIS — I10 ESSENTIAL HYPERTENSION: ICD-10-CM

## 2022-12-01 DIAGNOSIS — Z12.5 SCREENING FOR PROSTATE CANCER: ICD-10-CM

## 2022-12-01 LAB
ALBUMIN SERPL BCP-MCNC: 4.1 G/DL (ref 3.5–5.2)
ALP SERPL-CCNC: 65 U/L (ref 55–135)
ALT SERPL W/O P-5'-P-CCNC: 33 U/L (ref 10–44)
ANION GAP SERPL CALC-SCNC: 10 MMOL/L (ref 8–16)
AST SERPL-CCNC: 31 U/L (ref 10–40)
BILIRUB SERPL-MCNC: 1.2 MG/DL (ref 0.1–1)
BUN SERPL-MCNC: 18 MG/DL (ref 8–23)
CALCIUM SERPL-MCNC: 9.9 MG/DL (ref 8.7–10.5)
CHLORIDE SERPL-SCNC: 106 MMOL/L (ref 95–110)
CHOLEST SERPL-MCNC: 209 MG/DL (ref 120–199)
CHOLEST/HDLC SERPL: 2.2 {RATIO} (ref 2–5)
CO2 SERPL-SCNC: 24 MMOL/L (ref 23–29)
COMPLEXED PSA SERPL-MCNC: 0.76 NG/ML (ref 0–4)
CREAT SERPL-MCNC: 0.9 MG/DL (ref 0.5–1.4)
EST. GFR  (NO RACE VARIABLE): >60 ML/MIN/1.73 M^2
GLUCOSE SERPL-MCNC: 93 MG/DL (ref 70–110)
HDLC SERPL-MCNC: 95 MG/DL (ref 40–75)
HDLC SERPL: 45.5 % (ref 20–50)
LDLC SERPL CALC-MCNC: 105.6 MG/DL (ref 63–159)
NONHDLC SERPL-MCNC: 114 MG/DL
POTASSIUM SERPL-SCNC: 4 MMOL/L (ref 3.5–5.1)
PROT SERPL-MCNC: 7.1 G/DL (ref 6–8.4)
SODIUM SERPL-SCNC: 140 MMOL/L (ref 136–145)
TRIGL SERPL-MCNC: 42 MG/DL (ref 30–150)

## 2022-12-01 PROCEDURE — 84153 ASSAY OF PSA TOTAL: CPT | Performed by: FAMILY MEDICINE

## 2022-12-01 PROCEDURE — 36415 COLL VENOUS BLD VENIPUNCTURE: CPT | Performed by: FAMILY MEDICINE

## 2022-12-01 PROCEDURE — 80061 LIPID PANEL: CPT | Performed by: FAMILY MEDICINE

## 2022-12-01 PROCEDURE — 80053 COMPREHEN METABOLIC PANEL: CPT | Performed by: FAMILY MEDICINE

## 2022-12-08 ENCOUNTER — OFFICE VISIT (OUTPATIENT)
Dept: INTERNAL MEDICINE | Facility: CLINIC | Age: 77
End: 2022-12-08
Payer: MEDICARE

## 2022-12-08 VITALS
OXYGEN SATURATION: 100 % | TEMPERATURE: 97 F | BODY MASS INDEX: 23.71 KG/M2 | HEART RATE: 61 BPM | DIASTOLIC BLOOD PRESSURE: 82 MMHG | SYSTOLIC BLOOD PRESSURE: 136 MMHG | WEIGHT: 160.06 LBS | HEIGHT: 69 IN

## 2022-12-08 DIAGNOSIS — I70.0 AORTIC ATHEROSCLEROSIS: ICD-10-CM

## 2022-12-08 DIAGNOSIS — N40.0 BENIGN PROSTATIC HYPERPLASIA WITHOUT LOWER URINARY TRACT SYMPTOMS: ICD-10-CM

## 2022-12-08 DIAGNOSIS — E78.00 HYPERCHOLESTEREMIA: ICD-10-CM

## 2022-12-08 DIAGNOSIS — I10 PRIMARY HYPERTENSION: Primary | ICD-10-CM

## 2022-12-08 PROCEDURE — 99213 OFFICE O/P EST LOW 20 MIN: CPT | Mod: PBBFAC | Performed by: FAMILY MEDICINE

## 2022-12-08 PROCEDURE — 99214 PR OFFICE/OUTPT VISIT, EST, LEVL IV, 30-39 MIN: ICD-10-PCS | Mod: S$PBB,,, | Performed by: FAMILY MEDICINE

## 2022-12-08 PROCEDURE — 99999 PR PBB SHADOW E&M-EST. PATIENT-LVL III: CPT | Mod: PBBFAC,,, | Performed by: FAMILY MEDICINE

## 2022-12-08 PROCEDURE — 99999 PR PBB SHADOW E&M-EST. PATIENT-LVL III: ICD-10-PCS | Mod: PBBFAC,,, | Performed by: FAMILY MEDICINE

## 2022-12-08 PROCEDURE — 99214 OFFICE O/P EST MOD 30 MIN: CPT | Mod: S$PBB,,, | Performed by: FAMILY MEDICINE

## 2022-12-08 RX ORDER — LATANOPROST 50 UG/ML
SOLUTION/ DROPS OPHTHALMIC
COMMUNITY
Start: 2022-11-10

## 2022-12-08 RX ORDER — TRAMADOL HYDROCHLORIDE 50 MG/1
50 TABLET ORAL EVERY 6 HOURS PRN
COMMUNITY
Start: 2022-11-10 | End: 2022-12-08

## 2022-12-08 NOTE — PROGRESS NOTES
Subjective:       Patient ID: Trip Gomes is a . male.    Chief Complaint: Multiple issues see below      HPI htn dxd years ago in [place] and was on lisin. 5mg and eventually able to d/c. Runs occas higher in clinic. Digital med bps very nl. Exercises almost daily aerob and wts  evens statin well andimproved;hx atheroscl aorta    Bph no c/o  Hx melanoma utd derm dr barrett      About 5 years  for few hours every several weeks sporadic intermittent unusual smell usually triggered by a unique smell like pain no other symptoms no headache may have seen ENT uncertain.    Still occasional sensation possible numbness toes at night when she rubs on his feet otherwise no balance issues does not feel during the day normal B12 TSH last year    Alcohol use 4 beers per day discussed recommend to or less  Past Medical History:   Diagnosis Date    Cervical disc displacement     Hyperlipidemia     Hypertension     Kidney stones 11/5/2019    Melanoma of left upper arm 2012    Dr. Wyatt    White coat syndrome with hypertension      Past Surgical History:   Procedure Laterality Date    BASAL CELL CARCINOMA EXCISION      Right anterior tibia    FOOT SURGERY      Right 1st toe joint replacement    JOINT REPLACEMENT Right     R Hip, R big toe    KNEE ARTHROSCOPY W/ DEBRIDEMENT      Bilateral    LIPOMA RESECTION Left 06/03/2018    Dr. Pemberton at [facility]     Melanoma      Left upper arm    TOTAL HIP ARTHROPLASTY Right 11/10/2015     Family History   Problem Relation Age of Onset    Stroke Mother 67    Kidney cancer Father      Social History     Socioeconomic History    Marital status:      Spouse name: Tracy    Number of children: 1    Years of education: Not on file    Highest education level: Not on file   Occupational History    Occupation:      Comment: Retired   Social Needs    Financial resource strain: Not hard at all    Food insecurity     Worry: Never true     Inability: Never true     Transportation needs     Medical: No     Non-medical: No   Tobacco Use    Smoking status: Never Smoker    Smokeless tobacco: Never Used   Substance and Sexual Activity    Alcohol use: Yes     Alcohol/week: 6.0 - 7.0 standard drinks     Types: 6 - 7 Cans of beer per week     Frequency: 4 or more times a week     Drinks per session: 3 or 4     Binge frequency: Never     Comment: 2-3 cans/day    Drug use: No    Sexual activity: Yes     Partners: Female     Birth control/protection: None   Lifestyle    Physical activity     Days per week: 5 days     Minutes per session: 60 min    Stress: Only a little   Relationships    Social connections     Talks on phone: Once a week     Gets together: Twice a week     Attends Muslim service: Not on file     Active member of club or organization: Yes     Attends meetings of clubs or organizations: Never     Relationship status:    Other Topics Concern    Not on file   Social History Narrative    . 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   Constitutional:       Appearance: He is well-developed.   HENT:      Head: Normocephalic and atraumatic.     Ac clear     Right Ear: External ear normal.      Left Ear: External ear normal.   Eyes:      General: No scleral icterus.     Conjunctiva/sclera: Conjunctivae normal.      Pupils: Pupils are equal, round, and reactive to light.   Neck:      Musculoskeletal: Normal range of motion and neck supple.      Vascular: No carotid bruit.   Cardiovascular:      Rate and Rhythm: Normal rate and regular rhythm.      Heart sounds: Normal heart sounds. No murmur. No friction rub. No gallop.    Pulmonary:      Effort: Pulmonary effort is normal.      Breath sounds: Normal  breath sounds. No wheezing.   Abdominal:      General: Bowel sounds are normal. There is no distension.      Palpations: Abdomen is soft. There is no mass.      Tenderness: There is no abdominal tenderness. There is no guarding or rebound.   Musculoskeletal: Normal range of motion.         General: No tenderness.   Lymphadenopathy:      Cervical: No cervical adenopathy.   Skin:     General: Skin is warm and dry.      Findings: No erythema or rash.   Neurological:      Mental Status: He is alert and oriented to person, place, and time.      Cranial Nerves: No cranial nerve deficit.      Coordination: Coordination normal.   Psychiatric:         Behavior: Behavior normal.         Thought Content: Thought content normal.         Judgment: Judgment normal.         Assessment:      1. Aortic atherosclerosis    2. bph   3. Essential hypertension     hyperchol         Numbness toes  Plan:       *d/wd +/- psa.will discontinue  Cont exerc  Dec etoh from 4 to 2 beers /day. May help w toe numbness    Lab 12 months and follow up after    Primary hypertension  -     Comprehensive Metabolic Panel; Future; Expected date: 12/08/2023  -     Lipid Panel; Future; Expected date: 12/08/2023    Hypercholesteremia    Benign prostatic hyperplasia without lower urinary tract symptoms    Aortic atherosclerosis

## 2022-12-19 ENCOUNTER — DOCUMENTATION ONLY (OUTPATIENT)
Dept: RESEARCH | Facility: HOSPITAL | Age: 77
End: 2022-12-19
Payer: MEDICARE

## 2022-12-20 NOTE — PROGRESS NOTES
Sponsor: [employer]   Study: A Randomized, Double-blind, Placebo-controlled Phase 3 Study to Assess the Efficacy and Safety of Ad26.COV2.S for the Prevention of SARS-CoV-2-mediated COVID-19 in Adults Aged 18 Years and Older   IRB/Protocol #: 2020.275/ [id number]; Phase 3?   Principle Investigator/Sub-Investigator: Dr. Yoel Egan  Site Number: [id number]  Location: [place]  Subject Number: [id number]    Year 2 (Booster +52 Weeks) Telephone Visit    Has the participant tested positive COVID-19 since last visit? no  If yes, date of positive test? N/a  If yes, did the participant take any medication for COVID-19? N/a    Did the subject have any ongoing AE's no  If yes, AE end date? N/a    Did the subject have a provisional device? no  Subject was instructed to return provisional device? N/a  Provisional device number: n/a    Did the subject use a personal device? yes  Subject was instructed to delete Study Hub from device? yes      Subject was informed that from today, 12/19/2022, forward, study participation has concluded. Subject expressed understanding and was thanked for their participation in this study.    Off Study Date: 12/19/2022  Off Study Reason: Protocol Defined Follow Up Completed

## 2023-02-20 ENCOUNTER — PATIENT MESSAGE (OUTPATIENT)
Dept: ADMINISTRATIVE | Facility: OTHER | Age: 78
End: 2023-02-20
Payer: MEDICARE

## 2023-05-01 ENCOUNTER — PATIENT MESSAGE (OUTPATIENT)
Dept: OTHER | Facility: OTHER | Age: 78
End: 2023-05-01
Payer: MEDICARE

## 2023-08-19 ENCOUNTER — PATIENT MESSAGE (OUTPATIENT)
Dept: ADMINISTRATIVE | Facility: OTHER | Age: 78
End: 2023-08-19
Payer: MEDICARE

## 2023-09-28 ENCOUNTER — HOSPITAL ENCOUNTER (OUTPATIENT)
Dept: RADIOLOGY | Facility: CLINIC | Age: 78
Discharge: HOME | End: 2023-09-28
Attending: NURSE PRACTITIONER
Payer: MEDICARE

## 2023-09-28 ENCOUNTER — OFFICE VISIT (OUTPATIENT)
Dept: URGENT CARE | Facility: CLINIC | Age: 78
End: 2023-09-28
Payer: MEDICARE

## 2023-09-28 VITALS
RESPIRATION RATE: 18 BRPM | HEIGHT: 68 IN | OXYGEN SATURATION: 97 % | DIASTOLIC BLOOD PRESSURE: 75 MMHG | SYSTOLIC BLOOD PRESSURE: 155 MMHG | TEMPERATURE: 98 F | HEART RATE: 62 BPM | WEIGHT: 161.06 LBS | BODY MASS INDEX: 24.41 KG/M2

## 2023-09-28 DIAGNOSIS — R10.9 FLANK PAIN: Primary | ICD-10-CM

## 2023-09-28 DIAGNOSIS — R31.9 HEMATURIA, UNSPECIFIED TYPE: ICD-10-CM

## 2023-09-28 DIAGNOSIS — N20.0 NEPHROLITHIASIS: ICD-10-CM

## 2023-09-28 DIAGNOSIS — R10.9 ABDOMINAL PAIN, UNSPECIFIED ABDOMINAL LOCATION: ICD-10-CM

## 2023-09-28 LAB
BILIRUB UR QL STRIP: NEGATIVE
GLUCOSE UR QL STRIP: NEGATIVE
KETONES UR QL STRIP: NEGATIVE
LEUKOCYTE ESTERASE UR QL STRIP: NEGATIVE
PH, POC UA: 5.5
POC BLOOD, URINE: POSITIVE
POC NITRATES, URINE: NEGATIVE
PROT UR QL STRIP: NEGATIVE
SP GR UR STRIP: 1.01 (ref 1–1.03)
UROBILINOGEN UR STRIP-ACNC: NORMAL (ref 0.3–2.2)

## 2023-09-28 PROCEDURE — 99214 OFFICE O/P EST MOD 30 MIN: CPT | Mod: 25,S$GLB,, | Performed by: NURSE PRACTITIONER

## 2023-09-28 PROCEDURE — 96372 THER/PROPH/DIAG INJ SC/IM: CPT | Mod: S$GLB,,, | Performed by: NURSE PRACTITIONER

## 2023-09-28 PROCEDURE — 96372 PR INJECTION,THERAP/PROPH/DIAG2ST, IM OR SUBCUT: ICD-10-PCS | Mod: S$GLB,,, | Performed by: NURSE PRACTITIONER

## 2023-09-28 PROCEDURE — 74018 RADEX ABDOMEN 1 VIEW: CPT | Mod: S$GLB,,, | Performed by: RADIOLOGY

## 2023-09-28 PROCEDURE — 74018 XR ABDOMEN AP 1 VIEW: ICD-10-PCS | Mod: S$GLB,,, | Performed by: RADIOLOGY

## 2023-09-28 PROCEDURE — 99214 PR OFFICE/OUTPT VISIT, EST, LEVL IV, 30-39 MIN: ICD-10-PCS | Mod: 25,S$GLB,, | Performed by: NURSE PRACTITIONER

## 2023-09-28 PROCEDURE — 81003 POCT URINALYSIS, DIPSTICK, AUTOMATED, W/O SCOPE: ICD-10-PCS | Mod: QW,S$GLB,, | Performed by: NURSE PRACTITIONER

## 2023-09-28 PROCEDURE — 81003 URINALYSIS AUTO W/O SCOPE: CPT | Mod: QW,S$GLB,, | Performed by: NURSE PRACTITIONER

## 2023-09-28 RX ORDER — TRAMADOL HYDROCHLORIDE 50 MG/1
50 TABLET ORAL EVERY 6 HOURS PRN
Qty: 12 EACH | Refills: 0 | Status: SHIPPED | OUTPATIENT
Start: 2023-09-28 | End: 2023-10-01

## 2023-09-28 RX ORDER — KETOROLAC TROMETHAMINE 30 MG/ML
30 INJECTION, SOLUTION INTRAMUSCULAR; INTRAVENOUS
Status: COMPLETED | OUTPATIENT
Start: 2023-09-28 | End: 2023-09-28

## 2023-09-28 RX ORDER — TAMSULOSIN HYDROCHLORIDE 0.4 MG/1
0.4 CAPSULE ORAL DAILY
Qty: 14 CAPSULE | Refills: 0 | Status: SHIPPED | OUTPATIENT
Start: 2023-09-28 | End: 2023-12-18

## 2023-09-28 RX ADMIN — KETOROLAC TROMETHAMINE 30 MG: 30 INJECTION, SOLUTION INTRAMUSCULAR; INTRAVENOUS at 09:09

## 2023-09-28 NOTE — PROGRESS NOTES
"Subjective:      Patient ID: Trip Gomes is a 78 y.o. male.    Vitals:  height is 5' 8.11" (1.73 m) and weight is 73.1 kg (161 lb 0.7 oz). His oral temperature is 97.5 °F (36.4 °C). His blood pressure is 155/75 (abnormal) and his pulse is 62. His respiration is 18 and oxygen saturation is 97%.     Chief Complaint: Flank Pain (Left side pain started this am)    Patient presents today with left flank pain starting this morning. Patient has a history of kidney stones. He states that he has not taken anything for the pain of 7/10.    Flank Pain  This is a new problem. The current episode started today. The problem occurs constantly. The problem is unchanged. The pain is present in the lumbar spine. The quality of the pain is described as stabbing. The pain is at a severity of 7/10. The pain is moderate. Associated symptoms include abdominal pain. Pertinent negatives include no chest pain or fever. Risk factors include renal stones. He has tried nothing for the symptoms. The treatment provided no relief.       Constitution: Negative for chills and fever.   Cardiovascular:  Negative for chest pain.   Respiratory:  Negative for cough and shortness of breath.    Gastrointestinal:  Positive for abdominal pain. Negative for nausea and vomiting.   Genitourinary:  Positive for flank pain.   Skin:  Negative for rash.   Allergic/Immunologic: Negative for immunocompromised state.   Neurological:  Negative for disorientation.   Hematologic/Lymphatic: Negative for easy bruising/bleeding. Does not bruise/bleed easily.   Psychiatric/Behavioral:  Negative for disorientation and confusion.       Objective:     Physical Exam   Constitutional: He is oriented to person, place, and time. He appears well-developed.   HENT:   Head: Normocephalic and atraumatic.   Ears:   Right Ear: External ear normal.   Left Ear: External ear normal.   Nose: Nose normal.   Mouth/Throat: Mucous membranes are normal.   Eyes: Conjunctivae and lids are " normal.   Neck: Trachea normal. Neck supple.   Cardiovascular: Normal rate, regular rhythm and normal heart sounds.   Pulmonary/Chest: Effort normal and breath sounds normal. No respiratory distress.   Abdominal: Normal appearance and bowel sounds are normal. He exhibits no distension and no mass. Soft. There is no abdominal tenderness. There is no left CVA tenderness and no right CVA tenderness.   Musculoskeletal: Normal range of motion.         General: Normal range of motion.   Neurological: He is alert and oriented to person, place, and time. He has normal strength.   Skin: Skin is warm, dry, intact, not diaphoretic and not pale.   Psychiatric: His speech is normal and behavior is normal. Judgment and thought content normal.   Nursing note and vitals reviewed.      Assessment:     1. Flank pain    2. Hematuria, unspecified type    3. Abdominal pain, unspecified abdominal location    4. Nephrolithiasis        Plan:       Flank pain  -     POCT Urinalysis, Dipstick, Automated, W/O Scope  -     X-Ray Abdomen AP 1 View; Future; Expected date: 09/28/2023  -     CT Renal Stone Study ABD Pelvis WO; Future; Expected date: 09/28/2023  -     ketorolac injection 30 mg  -     tamsulosin (FLOMAX) 0.4 mg Cap; Take 1 capsule (0.4 mg total) by mouth once daily. for 14 days  Dispense: 14 capsule; Refill: 0  -     traMADoL (ULTRAM) 50 mg tablet; Take 1 tablet (50 mg total) by mouth every 6 (six) hours as needed for Pain.  Dispense: 12 each; Refill: 0    Hematuria, unspecified type  -     X-Ray Abdomen AP 1 View; Future; Expected date: 09/28/2023  -     CT Renal Stone Study ABD Pelvis WO; Future; Expected date: 09/28/2023  -     ketorolac injection 30 mg  -     tamsulosin (FLOMAX) 0.4 mg Cap; Take 1 capsule (0.4 mg total) by mouth once daily. for 14 days  Dispense: 14 capsule; Refill: 0  -     traMADoL (ULTRAM) 50 mg tablet; Take 1 tablet (50 mg total) by mouth every 6 (six) hours as needed for Pain.  Dispense: 12 each; Refill:  0    Abdominal pain, unspecified abdominal location  -     CT Renal Stone Study ABD Pelvis WO; Future; Expected date: 09/28/2023  -     ketorolac injection 30 mg  -     tamsulosin (FLOMAX) 0.4 mg Cap; Take 1 capsule (0.4 mg total) by mouth once daily. for 14 days  Dispense: 14 capsule; Refill: 0  -     traMADoL (ULTRAM) 50 mg tablet; Take 1 tablet (50 mg total) by mouth every 6 (six) hours as needed for Pain.  Dispense: 12 each; Refill: 0    Nephrolithiasis  -     CT Renal Stone Study ABD Pelvis WO; Future; Expected date: 09/28/2023  -     ketorolac injection 30 mg  -     tamsulosin (FLOMAX) 0.4 mg Cap; Take 1 capsule (0.4 mg total) by mouth once daily. for 14 days  Dispense: 14 capsule; Refill: 0  -     traMADoL (ULTRAM) 50 mg tablet; Take 1 tablet (50 mg total) by mouth every 6 (six) hours as needed for Pain.  Dispense: 12 each; Refill: 0          Medical Decision Making:   Initial Assessment:   Flank pain  Hematuria  Hx kidney stones  Differential Diagnosis:   Kidney Stones  UTI/ Pyelonephritis  Clinical Tests:   Lab Tests: Ordered and Reviewed       <> Summary of Lab: Urinalysis shows hematuria  No leukocytes/Nitrates  Radiological Study: Ordered and Reviewed  Urgent Care Management:  KUB negative for stones. Did order CT. He wants to try and pass the stones before going for CT. Advised if worsening pain/ hematuria to go to the ED. Given strainer       Drink plenty of fluid   Strain all urine  Take pain medications as prescribed/as needed  Take Flomax as prescribed.  May stop taking when stone passed.  Follow up with PCP/Urology within 1 week if no improvement.  Go to ER if worsening in any way.

## 2023-10-01 ENCOUNTER — TELEPHONE (OUTPATIENT)
Dept: URGENT CARE | Facility: CLINIC | Age: 78
End: 2023-10-01
Payer: MEDICARE

## 2023-10-21 ENCOUNTER — IMMUNIZATION (OUTPATIENT)
Dept: INTERNAL MEDICINE | Facility: CLINIC | Age: 78
End: 2023-10-21
Payer: MEDICARE

## 2023-10-21 PROCEDURE — G0008 ADMIN INFLUENZA VIRUS VAC: HCPCS | Mod: PBBFAC

## 2023-10-21 PROCEDURE — 99999PBSHW FLU VACCINE - QUADRIVALENT - ADJUVANTED: ICD-10-PCS | Mod: PBBFAC,,,

## 2023-10-21 PROCEDURE — 99999PBSHW FLU VACCINE - QUADRIVALENT - ADJUVANTED: Mod: PBBFAC,,,

## 2023-11-07 ENCOUNTER — OFFICE VISIT (OUTPATIENT)
Dept: OTOLARYNGOLOGY | Facility: CLINIC | Age: 78
End: 2023-11-07
Payer: MEDICARE

## 2023-11-07 VITALS — WEIGHT: 161.19 LBS | BODY MASS INDEX: 24.43 KG/M2 | HEIGHT: 68 IN

## 2023-11-07 DIAGNOSIS — R43.8 HYPOSMIA: Primary | ICD-10-CM

## 2023-11-07 DIAGNOSIS — R43.9 DECREASED TASTE AND SMELL: ICD-10-CM

## 2023-11-07 PROCEDURE — 99213 OFFICE O/P EST LOW 20 MIN: CPT | Mod: S$PBB,,, | Performed by: ORTHOPAEDIC SURGERY

## 2023-11-07 PROCEDURE — 99213 OFFICE O/P EST LOW 20 MIN: CPT | Mod: PBBFAC | Performed by: ORTHOPAEDIC SURGERY

## 2023-11-07 PROCEDURE — 99213 PR OFFICE/OUTPT VISIT, EST, LEVL III, 20-29 MIN: ICD-10-PCS | Mod: S$PBB,,, | Performed by: ORTHOPAEDIC SURGERY

## 2023-11-07 PROCEDURE — 99999 PR PBB SHADOW E&M-EST. PATIENT-LVL III: CPT | Mod: PBBFAC,,, | Performed by: ORTHOPAEDIC SURGERY

## 2023-11-07 PROCEDURE — 99999 PR PBB SHADOW E&M-EST. PATIENT-LVL III: ICD-10-PCS | Mod: PBBFAC,,, | Performed by: ORTHOPAEDIC SURGERY

## 2023-11-07 NOTE — PATIENT INSTRUCTIONS
I discussed with the patient that anosmia is most often a post-viral sequelae, more common following a COVID infection.  We discussed in detail the relevant anatomy of the olfactory nerve, that is located superiorly in the nasal cavity.  Exam today was normal, and did not reveal any intranasal mass or abnormality as the cause for anosmia. I would recommend the use of a daily multivitamin as well as saline nasal spray.  I would also recommend olfactory retraining, a protocol can be found at www.abscent.org.   Olfactory dysfunction most often resolves, though it may persist for several months.  On average, 80 percent of patients report subjective recovery after one year.    I would recommend trying the [employer] Smell Retraining kit

## 2023-11-07 NOTE — PROGRESS NOTES
Subjective:      Patient ID: Trip Gomes is a 78 y.o. male.    Chief Complaint: Other (Pt stastes that over the past year he has had a reduced sense of taste and smell )    Patient is a 78 year old gentleman seen today for evaluation of hyposmia and decreased sense of taste.  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  He has mild allergies, is not on any nasal sprays at this time.  Non-smoker.  Breathing fine through his nose.  He thinks this has been an issue for the last year, he does not remember being ill at this time.          Review of Systems   Constitutional: Negative.    HENT:  Positive for postnasal drip.    Eyes:  Positive for itching.   Cardiovascular: Negative.    Gastrointestinal: Negative.    Endocrine: Negative.    Genitourinary: Negative.    Musculoskeletal: Negative.    Skin: Negative.    Allergic/Immunologic: Negative.    Neurological:  Positive for light-headedness.   Hematological:  Bruises/bleeds easily.   Psychiatric/Behavioral: Negative.         Objective:       Physical Exam  Constitutional:       General: He is not in acute distress.     Appearance: He is well-developed.   HENT:      Head: Normocephalic and atraumatic.      Jaw: No trismus.      Right Ear: Hearing, tympanic membrane, ear canal and external ear normal.      Left Ear: Hearing, tympanic membrane, ear canal and external ear normal.      Nose: Nose normal. No nasal deformity, septal deviation, mucosal edema or rhinorrhea.      Mouth/Throat:      Dentition: Normal dentition.      Pharynx: Uvula midline. No oropharyngeal exudate or uvula swelling.   Eyes:      General: No scleral icterus.     Conjunctiva/sclera: Conjunctivae normal.      Right eye: Right conjunctiva is not injected. No chemosis.     Left eye: Left conjunctiva is not injected. No chemosis.     Pupils: Pupils are equal, round, and reactive to light.   Neck:      Thyroid: No thyroid mass or thyromegaly.      Trachea: Trachea and phonation normal.  No tracheal tenderness or tracheal deviation.   Pulmonary:      Effort: Pulmonary effort is normal. No accessory muscle usage or respiratory distress.      Breath sounds: No stridor.   Lymphadenopathy:      Head:      Right side of head: No submental, submandibular, preauricular or posterior auricular adenopathy.      Left side of head: No submental, submandibular, preauricular or posterior auricular adenopathy.      Cervical: No cervical adenopathy.      Right cervical: No superficial or deep cervical adenopathy.     Left cervical: No superficial or deep cervical adenopathy.   Skin:     General: Skin is warm and dry.      Findings: No erythema or rash.   Neurological:      Mental Status: He is alert and oriented to person, place, and time.      Cranial Nerves: No cranial nerve deficit.   Psychiatric:         Behavior: Behavior normal.         Thought Content: Thought content normal.         Assessment:       1. Hyposmia    2. Decreased taste and smell        Plan:     Hyposmia    Decreased taste and smell    I discussed with the patient that anosmia is most often a post-viral sequelae, more common following a COVID infection.  We discussed in detail the relevant anatomy of the olfactory nerve, that is located superiorly in the nasal cavity.  Exam today was normal, and did not reveal any intranasal mass or abnormality as the cause for anosmia. I would recommend the use of a daily multivitamin as well as saline nasal spray.  I would also recommend olfactory retraining, a protocol can be found at www.abscent.org.   Olfactory dysfunction most often resolves, though it may persist for several months.  On average, 80 percent of patients report subjective recovery after one year.

## 2023-12-01 ENCOUNTER — LAB VISIT (OUTPATIENT)
Dept: LAB | Facility: HOSPITAL | Age: 78
End: 2023-12-01
Attending: FAMILY MEDICINE
Payer: MEDICARE

## 2023-12-01 DIAGNOSIS — I10 PRIMARY HYPERTENSION: ICD-10-CM

## 2023-12-01 LAB
ALBUMIN SERPL BCP-MCNC: 3.9 G/DL (ref 3.5–5.2)
ALP SERPL-CCNC: 66 U/L (ref 55–135)
ALT SERPL W/O P-5'-P-CCNC: 25 U/L (ref 10–44)
ANION GAP SERPL CALC-SCNC: 6 MMOL/L (ref 8–16)
AST SERPL-CCNC: 27 U/L (ref 10–40)
BILIRUB SERPL-MCNC: 1.4 MG/DL (ref 0.1–1)
BUN SERPL-MCNC: 13 MG/DL (ref 8–23)
CALCIUM SERPL-MCNC: 9.4 MG/DL (ref 8.7–10.5)
CHLORIDE SERPL-SCNC: 106 MMOL/L (ref 95–110)
CHOLEST SERPL-MCNC: 212 MG/DL (ref 120–199)
CHOLEST/HDLC SERPL: 2.5 {RATIO} (ref 2–5)
CO2 SERPL-SCNC: 27 MMOL/L (ref 23–29)
CREAT SERPL-MCNC: 1 MG/DL (ref 0.5–1.4)
EST. GFR  (NO RACE VARIABLE): >60 ML/MIN/1.73 M^2
GLUCOSE SERPL-MCNC: 90 MG/DL (ref 70–110)
HDLC SERPL-MCNC: 86 MG/DL (ref 40–75)
HDLC SERPL: 40.6 % (ref 20–50)
LDLC SERPL CALC-MCNC: 116 MG/DL (ref 63–159)
NONHDLC SERPL-MCNC: 126 MG/DL
POTASSIUM SERPL-SCNC: 3.9 MMOL/L (ref 3.5–5.1)
PROT SERPL-MCNC: 6.8 G/DL (ref 6–8.4)
SODIUM SERPL-SCNC: 139 MMOL/L (ref 136–145)
TRIGL SERPL-MCNC: 50 MG/DL (ref 30–150)

## 2023-12-01 PROCEDURE — 80061 LIPID PANEL: CPT | Performed by: FAMILY MEDICINE

## 2023-12-01 PROCEDURE — 36415 COLL VENOUS BLD VENIPUNCTURE: CPT | Performed by: FAMILY MEDICINE

## 2023-12-01 PROCEDURE — 80053 COMPREHEN METABOLIC PANEL: CPT | Performed by: FAMILY MEDICINE

## 2023-12-03 NOTE — TELEPHONE ENCOUNTER
No care due was identified.  [facility] Embedded Care Due Messages. Reference number: [id number].   12/03/2023 3:20:00 AM CST

## 2023-12-04 RX ORDER — PRAVASTATIN SODIUM 40 MG/1
TABLET ORAL
Qty: 90 TABLET | Refills: 0 | Status: SHIPPED | OUTPATIENT
Start: 2023-12-04 | End: 2024-03-03

## 2023-12-04 NOTE — TELEPHONE ENCOUNTER
Refill Decision Note   Trip Mireya  is requesting a refill authorization.  Brief Assessment and Rationale for Refill:  Approve     Medication Therapy Plan:         Comments:     Note composed:4:14 AM 12/04/2023

## 2023-12-11 ENCOUNTER — TELEPHONE (OUTPATIENT)
Dept: PRIMARY CARE CLINIC | Facility: CLINIC | Age: 78
End: 2023-12-11
Payer: MEDICARE

## 2023-12-12 ENCOUNTER — TELEPHONE (OUTPATIENT)
Dept: INTERNAL MEDICINE | Facility: CLINIC | Age: 78
End: 2023-12-12
Payer: MEDICARE

## 2023-12-12 NOTE — TELEPHONE ENCOUNTER
----- Message from James Galeano LPN sent at 12/12/2023 10:59 AM CST -----  Contact: self  Patient would like to express his gratitude on you work so hard and vigorously in regards to scheduling him an appointment.  ----- Message -----  From: Cora Avery  Sent: 12/12/2023  10:46 AM CST  To: [facility] Staff    Pt is calling to thank Laquita for scheduling appointment for him his call back number is [phone] [facility]

## 2023-12-12 NOTE — TELEPHONE ENCOUNTER
Tried calling the patient concerning his annual exam with Dr Causey. A message was left for the patient that an appointment was scheduled for his annual exam with Dr Causey on 12/18/23 at 8:40 am. A return call was also, left to call Dr Causey office.

## 2023-12-12 NOTE — TELEPHONE ENCOUNTER
Spoke with the patient concerning his appointment with Dr Causey having to be rescheduled. Patient stated Thank you for the sooner appointment being scheduled for him. We will see the patient on 12/18/23 at 8:40 am.

## 2023-12-18 ENCOUNTER — OFFICE VISIT (OUTPATIENT)
Dept: INTERNAL MEDICINE | Facility: CLINIC | Age: 78
End: 2023-12-18
Payer: MEDICARE

## 2023-12-18 VITALS
DIASTOLIC BLOOD PRESSURE: 74 MMHG | OXYGEN SATURATION: 100 % | TEMPERATURE: 97 F | BODY MASS INDEX: 24.57 KG/M2 | WEIGHT: 161.63 LBS | HEART RATE: 70 BPM | RESPIRATION RATE: 18 BRPM | SYSTOLIC BLOOD PRESSURE: 108 MMHG

## 2023-12-18 DIAGNOSIS — E78.00 HYPERCHOLESTEREMIA: ICD-10-CM

## 2023-12-18 DIAGNOSIS — R17 ELEVATED BILIRUBIN: ICD-10-CM

## 2023-12-18 DIAGNOSIS — I70.0 AORTIC ATHEROSCLEROSIS: ICD-10-CM

## 2023-12-18 DIAGNOSIS — N40.0 BENIGN PROSTATIC HYPERPLASIA WITHOUT LOWER URINARY TRACT SYMPTOMS: ICD-10-CM

## 2023-12-18 DIAGNOSIS — I10 PRIMARY HYPERTENSION: Primary | ICD-10-CM

## 2023-12-18 PROCEDURE — 99214 OFFICE O/P EST MOD 30 MIN: CPT | Mod: S$PBB,,, | Performed by: FAMILY MEDICINE

## 2023-12-18 PROCEDURE — 99213 OFFICE O/P EST LOW 20 MIN: CPT | Mod: PBBFAC | Performed by: FAMILY MEDICINE

## 2023-12-18 PROCEDURE — 99999 PR PBB SHADOW E&M-EST. PATIENT-LVL III: ICD-10-PCS | Mod: PBBFAC,,, | Performed by: FAMILY MEDICINE

## 2023-12-18 PROCEDURE — 99214 PR OFFICE/OUTPT VISIT, EST, LEVL IV, 30-39 MIN: ICD-10-PCS | Mod: S$PBB,,, | Performed by: FAMILY MEDICINE

## 2023-12-18 PROCEDURE — 99999 PR PBB SHADOW E&M-EST. PATIENT-LVL III: CPT | Mod: PBBFAC,,, | Performed by: FAMILY MEDICINE

## 2023-12-18 NOTE — PROGRESS NOTES
Subjective:       Patient ID: Trip Gomes is a . male.    Chief Complaint: Multiple issues see below      HPI htn dxd years ago in [place] and was on lisin. 5mg and eventually able to d/c. Runs occas higher in clinic. Digital med bps very nl. Exercises almost daily aerob and wts;5 d/weeks  evens statin well andimproved;hx atheroscl aorta    Bph no c/o  Hx melanoma utd derm dr barrett      About 5 years  for few hours every several weeks sporadic intermittent unusual smell usually triggered by a unique smell like pain no other symptoms no headache may have seen ENT uncertain.    Still occasional sensation possible numbness toes at night when she rubs on his feet otherwise no balance issues does not feel during the day normal B12 TSH l;    Alcohol use 2-3 beers per day/one glass wine discussed recommend to or less    Recnt kidney stone/urg care. Passed stone. 3rd in couple decades. Saw urol once    Past Medical History:   Diagnosis Date    Cervical disc displacement     Hyperlipidemia     Hypertension     Kidney stones 11/5/2019    Melanoma of left upper arm 2012    Dr. Wyatt    White coat syndrome with hypertension      Past Surgical History:   Procedure Laterality Date    BASAL CELL CARCINOMA EXCISION      Right anterior tibia    FOOT SURGERY      Right 1st toe joint replacement    JOINT REPLACEMENT Right     R Hip, R big toe    KNEE ARTHROSCOPY W/ DEBRIDEMENT      Bilateral    LIPOMA RESECTION Left 06/03/2018    Dr. Pemberton at [facility]     Melanoma      Left upper arm    TOTAL HIP ARTHROPLASTY Right 11/10/2015     Family History   Problem Relation Age of Onset    Stroke Mother 67    Kidney cancer Father      Social History     Socioeconomic History    Marital status:      Spouse name: Tracy    Number of children: 1    Years of education: Not on file    Highest education level: Not on file   Occupational History    Occupation:      Comment: Retired   Social Needs    Financial  resource strain: Not hard at all    Food insecurity     Worry: Never true     Inability: Never true    Transportation needs     Medical: No     Non-medical: No   Tobacco Use    Smoking status: Never Smoker    Smokeless tobacco: Never Used   Substance and Sexual Activity    Alcohol use: Yes     Alcohol/week: 6.0 - 7.0 standard drinks     Types: 6 - 7 Cans of beer per week     Frequency: 4 or more times a week     Drinks per session: 3 or 4     Binge frequency: Never     Comment: 2-3 cans/day    Drug use: No    Sexual activity: Yes     Partners: Female     Birth control/protection: None   Lifestyle    Physical activity     Days per week: 5 days     Minutes per session: 60 min    Stress: Only a little   Relationships    Social connections     Talks on phone: Once a week     Gets together: Twice a week     Attends Alevism service: Not on file     Active member of club or organization: Yes     Attends meetings of clubs or organizations: Never     Relationship status:    Other Topics Concern    Not on file   Social History Narrative    . 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   Constitutional:       Appearance: He is well-developed.   HENT:      Head: Normocephalic and atraumatic.     Ac clear     Right Ear: External ear normal.      Left Ear: External ear normal.   Eyes:      General: No scleral icterus.     Conjunctiva/sclera: Conjunctivae normal.      Pupils: Pupils are equal, round, and reactive to light.   Neck:      Musculoskeletal: Normal range of motion and neck supple.      Vascular: No carotid bruit.   Cardiovascular:      Rate and Rhythm: Normal rate and regular rhythm.      Heart sounds: Normal heart sounds. No murmur. No  friction rub. No gallop.    Pulmonary:      Effort: Pulmonary effort is normal.      Breath sounds: Normal breath sounds. No wheezing.   Abdominal:      General: Bowel sounds are normal. There is no distension.      Palpations: Abdomen is soft. There is no mass.      Tenderness: There is no abdominal tenderness. There is no guarding or rebound.   Musculoskeletal: Normal range of motion.         General: No tenderness.   Lymphadenopathy:      Cervical: No cervical adenopathy.   Skin:     General: Skin is warm and dry.      Findings: No erythema or rash.   Neurological:      Mental Status: He is alert and oriented to person, place, and time.      Cranial Nerves: No cranial nerve deficit.      Coordination: Coordination normal.   Psychiatric:         Behavior: Behavior normal.         Thought Content: Thought content normal.         Judgment: Judgment normal.         Assessment:      1. Aortic atherosclerosis    2. bph   3. Essential hypertension     hyperchol         Numbness toes-chr  Plan:         Cont exerc  Dec etoh from 2 servings s /day. May help w toe numbness; also this and hydration may dec bili; plan smiley cmp two months    Lab 12 months and follow up after  Primary hypertension  -     Comprehensive Metabolic Panel; Future; Expected date: 12/17/2024  -     Lipid Panel; Future; Expected date: 12/17/2024    Hypercholesteremia    Benign prostatic hyperplasia without lower urinary tract symptoms    Aortic atherosclerosis    Elevated bilirubin  -     Comprehensive Metabolic Panel; Future; Expected date: 02/19/2024     s

## 2024-01-12 ENCOUNTER — PATIENT MESSAGE (OUTPATIENT)
Dept: INTERNAL MEDICINE | Facility: CLINIC | Age: 79
End: 2024-01-12
Payer: MEDICARE

## 2024-01-12 ENCOUNTER — TELEPHONE (OUTPATIENT)
Dept: INTERNAL MEDICINE | Facility: CLINIC | Age: 79
End: 2024-01-12
Payer: MEDICARE

## 2024-01-12 ENCOUNTER — HOSPITAL ENCOUNTER (OUTPATIENT)
Dept: RADIOLOGY | Facility: HOSPITAL | Age: 79
Discharge: HOME | End: 2024-01-12
Attending: FAMILY MEDICINE
Payer: MEDICARE

## 2024-01-12 DIAGNOSIS — R10.9 FLANK PAIN: Primary | ICD-10-CM

## 2024-01-12 DIAGNOSIS — R10.9 FLANK PAIN: ICD-10-CM

## 2024-01-12 PROCEDURE — 71046 X-RAY EXAM CHEST 2 VIEWS: CPT | Mod: 26,,, | Performed by: RADIOLOGY

## 2024-01-12 PROCEDURE — 71046 X-RAY EXAM CHEST 2 VIEWS: CPT | Mod: TC

## 2024-01-12 NOTE — TELEPHONE ENCOUNTER
See encounter entered by provider dated 1/12/2024. Labs, xray and urine scheduled for 1/12/2024. Pt scheduled to see provider on 1/16/2024 with US abd to follow.//ddw

## 2024-01-12 NOTE — TELEPHONE ENCOUNTER
Notified patient of results, medication, and recommendations. Patient verbalized understanding. Scheduled labs, xray, and urine on 1/12/2024. Scheduled us on 1/16/2024.//ddw

## 2024-01-16 ENCOUNTER — HOSPITAL ENCOUNTER (OUTPATIENT)
Dept: RADIOLOGY | Facility: HOSPITAL | Age: 79
Discharge: HOME | End: 2024-01-16
Attending: FAMILY MEDICINE
Payer: MEDICARE

## 2024-01-16 ENCOUNTER — OFFICE VISIT (OUTPATIENT)
Dept: INTERNAL MEDICINE | Facility: CLINIC | Age: 79
End: 2024-01-16
Payer: MEDICARE

## 2024-01-16 VITALS
HEIGHT: 68 IN | WEIGHT: 161.81 LBS | BODY MASS INDEX: 24.52 KG/M2 | HEART RATE: 64 BPM | DIASTOLIC BLOOD PRESSURE: 80 MMHG | OXYGEN SATURATION: 97 % | SYSTOLIC BLOOD PRESSURE: 128 MMHG

## 2024-01-16 DIAGNOSIS — R10.9 RIGHT FLANK PAIN: ICD-10-CM

## 2024-01-16 DIAGNOSIS — I70.0 AORTIC ATHEROSCLEROSIS: ICD-10-CM

## 2024-01-16 DIAGNOSIS — D75.89 MACROCYTOSIS WITHOUT ANEMIA: ICD-10-CM

## 2024-01-16 DIAGNOSIS — R10.9 RIGHT FLANK PAIN: Primary | ICD-10-CM

## 2024-01-16 PROCEDURE — 99214 OFFICE O/P EST MOD 30 MIN: CPT | Mod: S$PBB,,, | Performed by: FAMILY MEDICINE

## 2024-01-16 PROCEDURE — 99999 PR PBB SHADOW E&M-EST. PATIENT-LVL III: CPT | Mod: PBBFAC,,, | Performed by: FAMILY MEDICINE

## 2024-01-16 PROCEDURE — 99213 OFFICE O/P EST LOW 20 MIN: CPT | Mod: PBBFAC | Performed by: FAMILY MEDICINE

## 2024-01-16 PROCEDURE — 76700 US EXAM ABDOM COMPLETE: CPT | Mod: TC

## 2024-01-16 PROCEDURE — 76700 US EXAM ABDOM COMPLETE: CPT | Mod: 26,,, | Performed by: RADIOLOGY

## 2024-01-16 NOTE — PROGRESS NOTES
Subjective:      Patient ID: Trip Gomes is a 78 y.o. male.    Chief Complaint: Abdominal Pain and Low-back Pain    HPI r flank pain:no fever. No sob. No change with eating. Has been mild r lower thor area posteriorly and ok while exerc but some inc after exercising. No cp . No change urination or bms     Began jan 2 (day after starting dry January) (3-4 drinks/daily prior); began as upper abd pain and grad migrated to r flnk last 5-6 days. Daily improvement; no chagne w eating;overall improving, 2/10 and dull. No change w exerc but some inc in time after. No change w rom. No sob or cp.     Pain concerned about kidney stone hx and dads hx kidney cancer hx    No trauma.       Still doing w workign out    Past Medical History:   Diagnosis Date    Arthritis     Cervical disc displacement     Glaucoma     Hyperlipidemia     Hypertension     Kidney stones 11/05/2019    Melanoma of left upper arm 2012    Dr. Wyatt;now dr barrett    White coat syndrome with hypertension       Past Surgical History:   Procedure Laterality Date    BASAL CELL CARCINOMA EXCISION      Right anterior tibia    COLONOSCOPY N/A 1/12/2021    Procedure: COLONOSCOPY;  Surgeon: Deloris Sosa MD;  Location: [facility];  Service: Endoscopy;  Laterality: N/A;    FOOT SURGERY      Right 1st toe joint replacement    JOINT REPLACEMENT Right     R Hip, R big toe    KNEE ARTHROSCOPY W/ DEBRIDEMENT      Bilateral    LIPOMA RESECTION Left 06/03/2018    Dr. Pemberton at [facility]     Melanoma      Left upper arm    TOTAL HIP ARTHROPLASTY Right 11/10/2015      Social History     Socioeconomic History    Marital status:      Spouse name: Tracy    Number of children: 1   Occupational History    Occupation:      Comment: Retired   Tobacco Use    Smoking status: Never    Smokeless tobacco: Never   Substance and Sexual Activity    Alcohol use: Yes     Alcohol/week: 6.0 - 7.0 standard drinks of alcohol     Types: 6 - 7 Cans of beer  per week     Comment: 2-3 cans/day    Drug use: No    Sexual activity: Yes     Partners: Female     Birth control/protection: None   Social History Narrative    . 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      Social Determinants of Health     Financial Resource Strain: Low Risk  (12/5/2023)    Overall Financial Resource Strain (CARDIA)     Difficulty of Paying Living Expenses: Not hard at all   Food Insecurity: No Food Insecurity (12/5/2023)    Hunger Vital Sign     Worried About Running Out of Food in the Last Year: Never true     Ran Out of Food in the Last Year: Never true   Transportation Needs: No Transportation Needs (12/5/2023)    PRAPARE - Transportation     Lack of Transportation (Medical): No     Lack of Transportation (Non-Medical): No   Physical Activity: Sufficiently Active (12/5/2023)    Exercise Vital Sign     Days of Exercise per Week: 5 days     Minutes of Exercise per Session: 60 min   Stress: No Stress Concern Present (12/5/2023)    Citizen of [place] of Occupational Health - Occupational Stress Questionnaire     Feeling of Stress : Only a little   Social Connections: Unknown (12/5/2023)    Social Connection and Isolation Panel [NHANES]     Frequency of Communication with Friends and Family: Once a week     Frequency of Social Gatherings with Friends and Family: Three times a week     Active Member of Clubs or Organizations: Yes     Attends Club or Organization Meetings: 1 to 4 times per year     Marital Status:    Housing Stability: Low Risk  (12/5/2023)    Housing Stability Vital Sign     Unable to Pay for Housing in the Last Year: No     Number of Places Lived in the Last Year: 1     Unstable Housing in the Last Year: No      Family History   Problem Relation Age of Onset    Stroke Mother 67    Kidney cancer Father       Review of Systems   Gastrointestinal:  Positive for abdominal pain (abd pain component resolved x for occas mild pain  that is separate from back pain and short duration).   Musculoskeletal:  Positive for back pain.           Objective:     Physical Exam  Vitals and nursing note reviewed.   Constitutional:       Appearance: He is well-developed.   HENT:      Head: Normocephalic and atraumatic.   Neck:      Vascular: No carotid bruit.   Cardiovascular:      Rate and Rhythm: Normal rate and regular rhythm.   Pulmonary:      Effort: Pulmonary effort is normal.      Breath sounds: Normal breath sounds.   Abdominal:      General: Bowel sounds are normal. There is no distension.      Palpations: Abdomen is soft. There is no mass.      Tenderness: There is no abdominal tenderness. There is no guarding or rebound.   Musculoskeletal:      Cervical back: Normal range of motion and neck supple.   Lymphadenopathy:      Cervical: No cervical adenopathy.   Skin:     General: Skin is warm and dry.   Neurological:      Mental Status: He is alert and oriented to person, place, and time.   Psychiatric:         Behavior: Behavior normal.         Judgment: Judgment normal.     M/s: he points ro right lower  parathor area. No rash. No ttp.   Assessment:         ICD-10-CM ICD-9-CM   1. Right flank pain  R10.9 789.09   2. Macrocytosis without anemia  D75.89 289.89   3. Aortic atherosclerosis  I70.0 440.0      Plan:    Cancel February cmp (was a smiley bilir)  Will be off etoh mid feb still so recheck mcv related labs then    Cancel February 7 lab please  Also add new lab after feb 18  Please check with radiology to make sure todays ultrasound will  right kidney as well as liver. If needed can convert to complete abd u/s as his other concern is r kidney sec famhx    If u/s liver, r kidney ok then allow more time and if pain not gone by next Monday then notify me and plan ct abd and if nl and pain cont then egd      1. Right flank pain    2. Macrocytosis without anemia    3. Aortic atherosclerosis  Assessment & Plan:  Cont statin           Smiley cbc etc  in a month (will have been off etoh)      Answers submitted by the patient for this visit:  Back Pain Questionnaire (Submitted on 1/9/2024)  Chief Complaint: Back pain  Chronicity: new  Onset: in the past 7 days  Frequency: daily  Progression since onset: gradually improving  Pain location: costovertebral angle  Pain quality: cramping  Radiates to: does not radiate  Pain - numeric: 2/10  Pain is: worse during the day  Stiffness is present: all day  Risk factors: history of renal stones  Pain severity: mild  Improvement on treatment: moderate

## 2024-01-18 ENCOUNTER — OFFICE VISIT (OUTPATIENT)
Dept: SURGERY | Facility: CLINIC | Age: 79
End: 2024-01-18
Payer: MEDICARE

## 2024-01-18 VITALS
SYSTOLIC BLOOD PRESSURE: 154 MMHG | HEIGHT: 68 IN | DIASTOLIC BLOOD PRESSURE: 82 MMHG | WEIGHT: 162.25 LBS | BODY MASS INDEX: 24.59 KG/M2 | HEART RATE: 68 BPM | TEMPERATURE: 97 F

## 2024-01-18 DIAGNOSIS — R10.9 ABDOMINAL PAIN, UNSPECIFIED ABDOMINAL LOCATION: ICD-10-CM

## 2024-01-18 DIAGNOSIS — K82.8 SLUDGE IN GALLBLADDER: Primary | ICD-10-CM

## 2024-01-18 DIAGNOSIS — K82.8 GALLBLADDER SLUDGE: ICD-10-CM

## 2024-01-18 DIAGNOSIS — K82.8 GALLBLADDER SLUDGE: Primary | ICD-10-CM

## 2024-01-18 PROCEDURE — 99999 PR PBB SHADOW E&M-EST. PATIENT-LVL IV: CPT | Mod: PBBFAC,,, | Performed by: SURGERY

## 2024-01-18 PROCEDURE — 99203 OFFICE O/P NEW LOW 30 MIN: CPT | Mod: S$PBB,,, | Performed by: SURGERY

## 2024-01-18 PROCEDURE — 99214 OFFICE O/P EST MOD 30 MIN: CPT | Mod: PBBFAC | Performed by: SURGERY

## 2024-01-18 NOTE — PATIENT INSTRUCTIONS
Should you have more frequent episodes of abdominal pain between now and your vacation please let us know.  If you have severe abdominal pain that persists for more than 4 hours please go to the emergency room.      If you would like to further discuss cholecystectomy when you returned from her vacation please contact our office to schedule an appointment or make an appointment using the my [facility] application      Our office phone numbers are  [phone] and

## 2024-02-15 ENCOUNTER — PATIENT MESSAGE (OUTPATIENT)
Dept: ADMINISTRATIVE | Facility: OTHER | Age: 79
End: 2024-02-15
Payer: MEDICARE

## 2024-02-19 ENCOUNTER — LAB VISIT (OUTPATIENT)
Dept: LAB | Facility: HOSPITAL | Age: 79
End: 2024-02-19
Attending: FAMILY MEDICINE
Payer: MEDICARE

## 2024-02-19 DIAGNOSIS — D75.89 MACROCYTOSIS WITHOUT ANEMIA: ICD-10-CM

## 2024-02-19 DIAGNOSIS — R17 ELEVATED BILIRUBIN: ICD-10-CM

## 2024-02-19 LAB
ALBUMIN SERPL BCP-MCNC: 3.9 G/DL (ref 3.5–5.2)
ALP SERPL-CCNC: 70 U/L (ref 55–135)
ALT SERPL W/O P-5'-P-CCNC: 21 U/L (ref 10–44)
ANION GAP SERPL CALC-SCNC: 9 MMOL/L (ref 8–16)
AST SERPL-CCNC: 23 U/L (ref 10–40)
BASOPHILS # BLD AUTO: 0.03 K/UL (ref 0–0.2)
BASOPHILS NFR BLD: 0.5 % (ref 0–1.9)
BILIRUB SERPL-MCNC: 0.7 MG/DL (ref 0.1–1)
BUN SERPL-MCNC: 14 MG/DL (ref 8–23)
CALCIUM SERPL-MCNC: 10.5 MG/DL (ref 8.7–10.5)
CHLORIDE SERPL-SCNC: 105 MMOL/L (ref 95–110)
CO2 SERPL-SCNC: 23 MMOL/L (ref 23–29)
CREAT SERPL-MCNC: 0.9 MG/DL (ref 0.5–1.4)
DIFFERENTIAL METHOD BLD: ABNORMAL
EOSINOPHIL # BLD AUTO: 0.2 K/UL (ref 0–0.5)
EOSINOPHIL NFR BLD: 2.7 % (ref 0–8)
ERYTHROCYTE [DISTWIDTH] IN BLOOD BY AUTOMATED COUNT: 13.3 % (ref 11.5–14.5)
EST. GFR  (NO RACE VARIABLE): >60 ML/MIN/1.73 M^2
FOLATE SERPL-MCNC: 15.3 NG/ML (ref 4–24)
GLUCOSE SERPL-MCNC: 97 MG/DL (ref 70–110)
HCT VFR BLD AUTO: 44.4 % (ref 40–54)
HGB BLD-MCNC: 15.9 G/DL (ref 14–18)
IMM GRANULOCYTES # BLD AUTO: 0.02 K/UL (ref 0–0.04)
IMM GRANULOCYTES NFR BLD AUTO: 0.3 % (ref 0–0.5)
LYMPHOCYTES # BLD AUTO: 1.3 K/UL (ref 1–4.8)
LYMPHOCYTES NFR BLD: 21.2 % (ref 18–48)
MCH RBC QN AUTO: 34.9 PG (ref 27–31)
MCHC RBC AUTO-ENTMCNC: 35.8 G/DL (ref 32–36)
MCV RBC AUTO: 97 FL (ref 82–98)
MONOCYTES # BLD AUTO: 0.5 K/UL (ref 0.3–1)
MONOCYTES NFR BLD: 7.7 % (ref 4–15)
NEUTROPHILS # BLD AUTO: 4.2 K/UL (ref 1.8–7.7)
NEUTROPHILS NFR BLD: 67.6 % (ref 38–73)
NRBC BLD-RTO: 0 /100 WBC
PLATELET # BLD AUTO: 231 K/UL (ref 150–450)
PMV BLD AUTO: 9.6 FL (ref 9.2–12.9)
POTASSIUM SERPL-SCNC: 4.8 MMOL/L (ref 3.5–5.1)
PROT SERPL-MCNC: 7.2 G/DL (ref 6–8.4)
RBC # BLD AUTO: 4.56 M/UL (ref 4.6–6.2)
SODIUM SERPL-SCNC: 137 MMOL/L (ref 136–145)
TSH SERPL DL<=0.005 MIU/L-ACNC: 1.92 UIU/ML (ref 0.4–4)
VIT B12 SERPL-MCNC: 573 PG/ML (ref 210–950)
WBC # BLD AUTO: 6.24 K/UL (ref 3.9–12.7)

## 2024-02-19 PROCEDURE — 80053 COMPREHEN METABOLIC PANEL: CPT | Performed by: FAMILY MEDICINE

## 2024-02-19 PROCEDURE — 82746 ASSAY OF FOLIC ACID SERUM: CPT | Mod: GA | Performed by: FAMILY MEDICINE

## 2024-02-19 PROCEDURE — 85025 COMPLETE CBC W/AUTO DIFF WBC: CPT | Performed by: FAMILY MEDICINE

## 2024-02-19 PROCEDURE — 82607 VITAMIN B-12: CPT | Mod: GA | Performed by: FAMILY MEDICINE

## 2024-02-19 PROCEDURE — 84443 ASSAY THYROID STIM HORMONE: CPT | Mod: GA | Performed by: FAMILY MEDICINE

## 2024-02-19 PROCEDURE — 36415 COLL VENOUS BLD VENIPUNCTURE: CPT | Performed by: FAMILY MEDICINE

## 2024-02-27 DIAGNOSIS — Z00.00 ENCOUNTER FOR MEDICARE ANNUAL WELLNESS EXAM: ICD-10-CM

## 2024-03-03 RX ORDER — PRAVASTATIN SODIUM 40 MG/1
TABLET ORAL
Qty: 90 TABLET | Refills: 3 | Status: SHIPPED | OUTPATIENT
Start: 2024-03-03

## 2024-03-03 NOTE — TELEPHONE ENCOUNTER
Refill Decision Note   Trip Gomes  is requesting a refill authorization.  Brief Assessment and Rationale for Refill:  Approve     Medication Therapy Plan:       Medication Reconciliation Completed: No   Comments:     No Care Gaps recommended.     Note composed:12:52 PM 03/03/2024

## 2024-03-03 NOTE — TELEPHONE ENCOUNTER
No care due was identified.  [facility] Embedded Care Due Messages. Reference number: [id number].   3/03/2024 7:35:09 AM CST

## 2024-04-21 NOTE — PROGRESS NOTES
Patient ID: Trip Gomes is a 78 y.o. male.    Chief Complaint: No chief complaint on file.      HPI:  Patient was a 78-year-old male was sent for evaluation of gallbladder sludge.  The patient states he has been having mild episodes of abdominal pain. .  It was not associated with any changes in bowel habit the pain can occasionally occur postprandially.  The pain starts in the epigastrium it can moved to the right side of the abdomen a right flank.  It was not associated with vomiting the patient underwent a gallbladder ultrasound that showed sludge.      The patient also drinks about 3 beers a day.  He decided to stop drinking in January but it was likely to to resumed drinking in February as he says is going through what he calls a dry drain you very.      He was planning a vacation February.  He does not desire to pursue cholecystectomy till after he returns from his vacation    Review of Systems   Constitutional:  Negative for activity change and unexpected weight change.   HENT:  Negative for hearing loss, rhinorrhea and trouble swallowing.    Eyes:  Negative for discharge and visual disturbance.   Respiratory:  Negative for chest tightness and wheezing.    Cardiovascular:  Negative for chest pain and palpitations.   Gastrointestinal:  Positive for abdominal pain. Negative for blood in stool, constipation, diarrhea and vomiting.   Endocrine: Negative for polydipsia and polyuria.   Genitourinary:  Negative for difficulty urinating, hematuria and urgency.   Musculoskeletal:  Negative for arthralgias, joint swelling and neck pain.   Neurological:  Negative for weakness and headaches.   Psychiatric/Behavioral:  Negative for confusion and dysphoric mood.      Current Outpatient Medications   Medication Sig Dispense Refill    latanoprost 0.005 % ophthalmic solution PLACE 1 DROP INTO BOTH EYES EVERY NIGHT AT BEDTIME      multivitamin (THERAGRAN) per tablet Take 1 tablet by mouth once daily.      polypodium  leucotomos extract (HELIOCARE ORAL) Take by mouth 2 (two) times a day.      pravastatin (PRAVACHOL) 40 MG tablet TAKE 1 TABLET BY MOUTH EVERY DAY 90 tablet 0     No current facility-administered medications for this visit.       Review of patient's allergies indicates:  No Known Allergies    Past Medical History:   Diagnosis Date    Arthritis     Cervical disc displacement     Glaucoma     Hyperlipidemia     Hypertension     Kidney stones 11/05/2019    Melanoma of left upper arm 2012    Dr. Wyatt;now dr barrett    White coat syndrome with hypertension        Past Surgical History:   Procedure Laterality Date    BASAL CELL CARCINOMA EXCISION      Right anterior tibia    COLONOSCOPY N/A 1/12/2021    Procedure: COLONOSCOPY;  Surgeon: Deloris Sosa MD;  [facility];  Service: Endoscopy;  Laterality: N/A;    FOOT SURGERY      Right 1st toe joint replacement    JOINT REPLACEMENT Right     R Hip, R big toe    KNEE ARTHROSCOPY W/ DEBRIDEMENT      Bilateral    LIPOMA RESECTION Left 06/03/2018    Dr. Pemberton at [facility]     Melanoma      Left upper arm    TOTAL HIP ARTHROPLASTY Right 11/10/2015       Social History     Socioeconomic History    Marital status:      Spouse name: Tracy    Number of children: 1   Occupational History    Occupation:      Comment: Retired   Tobacco Use    Smoking status: Never    Smokeless tobacco: Never   Substance and Sexual Activity    Alcohol use: Yes     Alcohol/week: 21.0 standard drinks of alcohol     Types: 21 Cans of beer per week     Comment: 2-3 cans/day    Drug use: No    Sexual activity: Yes     Partners: Female     Birth control/protection: None   Social History Narrative    . 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      Social Determinants of Health     Financial Resource Strain: Low Risk  (12/5/2023)    Overall Financial Resource Strain (CARDIA)     Difficulty of Paying Living Expenses:  Not hard at all   Food Insecurity: No Food Insecurity (12/5/2023)    Hunger Vital Sign     Worried About Running Out of Food in the Last Year: Never true     Ran Out of Food in the Last Year: Never true   Transportation Needs: No Transportation Needs (12/5/2023)    PRAPARE - Transportation     Lack of Transportation (Medical): No     Lack of Transportation (Non-Medical): No   Physical Activity: Sufficiently Active (12/5/2023)    Exercise Vital Sign     Days of Exercise per Week: 5 days     Minutes of Exercise per Session: 60 min   Stress: No Stress Concern Present (12/5/2023)    [place] of Occupational Health - Occupational Stress Questionnaire     Feeling of Stress : Only a little   Social Connections: Unknown (12/5/2023)    Social Connection and Isolation Panel [NHANES]     Frequency of Communication with Friends and Family: Once a week     Frequency of Social Gatherings with Friends and Family: Three times a week     Active Member of Clubs or Organizations: Yes     Attends Club or Organization Meetings: 1 to 4 times per year     Marital Status:    Housing Stability: Low Risk  (12/5/2023)    Housing Stability Vital Sign     Unable to Pay for Housing in the Last Year: No     Number of Places Lived in the Last Year: 1     Unstable Housing in the Last Year: No       Vitals:    01/18/24 1304   BP: (!) 154/82   Pulse: 68   Temp: 97.3 °F (36.3 °C)       Physical Exam  Constitutional:       General: He is not in acute distress.     Appearance: He is well-developed.   HENT:      Head: Normocephalic and atraumatic.   Eyes:      General: No scleral icterus.     Pupils: Pupils are equal, round, and reactive to light.   Neck:      Thyroid: No thyromegaly.      Vascular: No JVD.      Trachea: No tracheal deviation.   Cardiovascular:      Rate and Rhythm: Normal rate and regular rhythm.      Heart sounds: Normal heart sounds.   Pulmonary:      Effort: Pulmonary effort is normal.      Breath sounds: Normal  breath sounds.   Abdominal:      General: Bowel sounds are normal. There is no distension.      Palpations: Abdomen is soft. There is no mass.      Tenderness: There is no abdominal tenderness. There is no guarding or rebound.   Musculoskeletal:         General: Normal range of motion.      Cervical back: Normal range of motion and neck supple.   Lymphadenopathy:      Cervical: No cervical adenopathy.   Skin:     General: Skin is warm and dry.   Neurological:      Mental Status: He is alert and oriented to person, place, and time.   Psychiatric:         Mood and Affect: Mood normal.         Behavior: Behavior normal.         Thought Content: Thought content normal.         Judgment: Judgment normal.     CBC was reviewed  CMP was reviewed, normal liver function tests   Abdominal ultrasound was reviewed, showing gallbladder sludge    Assessment & Plan:    Gallbladder sludge likely a contributing factor to his abdominal symptoms.  I feel he would benefit from cholecystectomy.  I did discuss the risks benefits and complications of cholecystectomy with him.      I discussed the signs of biliary colic/acute cholecystitis and that he would need to seek care if this develops.      I have asked him to contact us after he returns from his vacation if he would like to further discuss cholecystectomy.      Information and contact number were provided in the after visit summary   Negative

## 2024-06-20 DIAGNOSIS — M79.642 PAIN IN BOTH HANDS: Primary | ICD-10-CM

## 2024-06-20 DIAGNOSIS — M79.641 PAIN IN BOTH HANDS: Primary | ICD-10-CM

## 2024-06-24 ENCOUNTER — HOSPITAL ENCOUNTER (OUTPATIENT)
Dept: RADIOLOGY | Facility: HOSPITAL | Age: 79
Discharge: HOME | End: 2024-06-24
Attending: STUDENT IN AN ORGANIZED HEALTH CARE EDUCATION/TRAINING PROGRAM
Payer: MEDICARE

## 2024-06-24 ENCOUNTER — OFFICE VISIT (OUTPATIENT)
Dept: ORTHOPEDICS | Facility: CLINIC | Age: 79
End: 2024-06-24
Payer: MEDICARE

## 2024-06-24 DIAGNOSIS — M79.641 PAIN IN BOTH HANDS: ICD-10-CM

## 2024-06-24 DIAGNOSIS — M65.331 TRIGGER MIDDLE FINGER OF RIGHT HAND: ICD-10-CM

## 2024-06-24 DIAGNOSIS — M79.642 PAIN IN BOTH HANDS: ICD-10-CM

## 2024-06-24 DIAGNOSIS — M65.332 TRIGGER MIDDLE FINGER OF LEFT HAND: Primary | ICD-10-CM

## 2024-06-24 PROCEDURE — 99999 PR PBB SHADOW E&M-EST. PATIENT-LVL I: CPT | Mod: PBBFAC,,, | Performed by: STUDENT IN AN ORGANIZED HEALTH CARE EDUCATION/TRAINING PROGRAM

## 2024-06-24 PROCEDURE — 99211 OFF/OP EST MAY X REQ PHY/QHP: CPT | Mod: PBBFAC,25 | Performed by: STUDENT IN AN ORGANIZED HEALTH CARE EDUCATION/TRAINING PROGRAM

## 2024-06-24 PROCEDURE — 73130 X-RAY EXAM OF HAND: CPT | Mod: 26,50,, | Performed by: RADIOLOGY

## 2024-06-24 PROCEDURE — 20550 NJX 1 TENDON SHEATH/LIGAMENT: CPT | Mod: PBBFAC,LT | Performed by: STUDENT IN AN ORGANIZED HEALTH CARE EDUCATION/TRAINING PROGRAM

## 2024-06-24 PROCEDURE — 99999PBSHW PR PBB SHADOW TECHNICAL ONLY FILED TO HB: Mod: PBBFAC,,,

## 2024-06-24 PROCEDURE — 73130 X-RAY EXAM OF HAND: CPT | Mod: TC,50

## 2024-06-24 PROCEDURE — 99204 OFFICE O/P NEW MOD 45 MIN: CPT | Mod: 25,S$PBB,, | Performed by: STUDENT IN AN ORGANIZED HEALTH CARE EDUCATION/TRAINING PROGRAM

## 2024-06-24 PROCEDURE — 20550 NJX 1 TENDON SHEATH/LIGAMENT: CPT | Mod: PBBFAC,50 | Performed by: STUDENT IN AN ORGANIZED HEALTH CARE EDUCATION/TRAINING PROGRAM

## 2024-06-24 RX ADMIN — TRIAMCINOLONE ACETONIDE 40 MG: 40 INJECTION, SUSPENSION INTRA-ARTICULAR; INTRAMUSCULAR at 09:06

## 2024-06-27 RX ORDER — TRIAMCINOLONE ACETONIDE 40 MG/ML
40 INJECTION, SUSPENSION INTRA-ARTICULAR; INTRAMUSCULAR
Status: DISCONTINUED | OUTPATIENT
Start: 2024-06-24 | End: 2024-06-27 | Stop reason: HOSPADM

## 2024-06-27 NOTE — PROCEDURES
Right Middle Trigger Finger Injection    Date/Time: 6/24/2024 9:20 AM    Performed by: Laura Ross MD  Authorized by: Laura Ross MD    Consent Done?:  Yes (Verbal)  Indications:  Pain  Timeout: prior to procedure the correct patient, procedure, and site was verified    Local anesthesia used?: Yes    Anesthesia:  Local infiltration  Local anesthetic:  Lidocaine 1% without epinephrine  Anesthetic total (ml):  1    Location:  Long finger  Site:  R long flexor tendon sheath  Ultrasonic guidance for needle placement?: No    Needle size:  25 G  Approach:  Volar  Medications:  40 mg triamcinolone acetonide 40 mg/mL  Patient tolerance:  Patient tolerated the procedure well with no immediate complications

## 2024-06-27 NOTE — PROCEDURES
Left Middle Trigger Finger Injection    Date/Time: 6/24/2024 9:20 AM    Performed by: Laura Ross MD  Authorized by: Laura Ross MD    Consent Done?:  Yes (Verbal)  Indications:  Pain  Timeout: prior to procedure the correct patient, procedure, and site was verified    Local anesthesia used?: Yes    Anesthesia:  Local infiltration  Local anesthetic:  Lidocaine 1% without epinephrine  Location:  Long finger  Site:  L long flexor tendon sheath  Ultrasonic guidance for needle placement?: No    Needle size:  25 G  Approach:  Volar  Medications:  40 mg triamcinolone acetonide 40 mg/mL  Patient tolerance:  Patient tolerated the procedure well with no immediate complications

## 2024-06-27 NOTE — PROGRESS NOTES
Hand Surgery Clinic Note    Chief Complaint  Bilateral middle finger pain    History of Present Illness  79-year-old right-hand dominant male who is retired presents for evaluation of bilateral middle finger pain.  Patient was also experiencing catching and locking symptoms in these fingers.  He experiences stiffness as well.  No numbness or tingling.  No history of diabetes.  Symptoms have taken place for 3-4 weeks.  The left side is more painful and symptomatic than the right side.    Review of Systems  Review of systems negative for chest pain, shortness of breath, fevers, chills, nausea/vomiting.    Past Medical History  Past Medical History:   Diagnosis Date    Arthritis     Cervical disc displacement     Glaucoma     Hyperlipidemia     Hypertension     Kidney stones 11/05/2019    Melanoma of left upper arm 2012    Dr. Wyatt;now dr barrett    White coat syndrome with hypertension        Past Surgical History  Past Surgical History:   Procedure Laterality Date    BASAL CELL CARCINOMA EXCISION      Right anterior tibia    COLONOSCOPY N/A 1/12/2021    Procedure: COLONOSCOPY;  Surgeon: Deloris Sosa MD;  Location: [facility];  Service: Endoscopy;  Laterality: N/A;    FOOT SURGERY      Right 1st toe joint replacement    JOINT REPLACEMENT Right     R Hip, R big toe    KNEE ARTHROSCOPY W/ DEBRIDEMENT      Bilateral    LIPOMA RESECTION Left 06/03/2018    Dr. Pemberton at [facility]     Melanoma      Left upper arm    TOTAL HIP ARTHROPLASTY Right 11/10/2015       Allergies  Review of patient's allergies indicates:  No Known Allergies    Family History  Family History   Problem Relation Name Age of Onset    Stroke Mother Arin 67    Kidney cancer Father         Social History  Social History     Socioeconomic History    Marital status:      Spouse name: Tracy    Number of children: 1   Occupational History    Occupation:      Comment: Retired   Tobacco Use    Smoking status: Never     Smokeless tobacco: Never   Substance and Sexual Activity    Alcohol use: Yes     Alcohol/week: 21.0 standard drinks of alcohol     Types: 21 Cans of beer per week     Comment: 2-3 cans/day    Drug use: No    Sexual activity: Yes     Partners: Female     Birth control/protection: None   Social History Narrative    . 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      Social Determinants of Health     Financial Resource Strain: Low Risk  (12/5/2023)    Overall Financial Resource Strain (CARDIA)     Difficulty of Paying Living Expenses: Not hard at all   Food Insecurity: No Food Insecurity (12/5/2023)    Hunger Vital Sign     Worried About Running Out of Food in the Last Year: Never true     Ran Out of Food in the Last Year: Never true   Transportation Needs: No Transportation Needs (12/5/2023)    PRAPARE - Transportation     Lack of Transportation (Medical): No     Lack of Transportation (Non-Medical): No   Physical Activity: Sufficiently Active (12/5/2023)    Exercise Vital Sign     Days of Exercise per Week: 5 days     Minutes of Exercise per Session: 60 min   Stress: No Stress Concern Present (12/5/2023)    [employer] - Occupational Stress Questionnaire     Feeling of Stress : Only a little   Housing Stability: Low Risk  (12/5/2023)    Housing Stability Vital Sign     Unable to Pay for Housing in the Last Year: No     Number of Places Lived in the Last Year: 1     Unstable Housing in the Last Year: No       Vital Signs  There were no vitals filed for this visit.    Physical Exam  Constitutional: Appears well-developed and well-nourished. No distress.   HENT:   Head: Normocephalic.   Eyes: EOM are normal.   Pulmonary/Chest: Effort normal.   Neurological: Oriented to person, place, and time.   Psychiatric: Normal mood and affect.     Right Upper Extremity:  No abrasions, lacerations, wounds.  No swelling.  No erythema.  Patient is able to  make a fist and extend all of his fingers.  Patient is tender over the middle finger A1 pulley.  No tenderness over the A1 pulleys of the other 4 fingers.  There is a palpable click at the level of the middle finger A1 pulley with range of motion.  There is no triggering with range of motion of the other 4 fingers.  Patient has full active motion at the middle finger MCP, PIP, and DIPJ joints.  Sensation is intact in the median, radial, and ulnar nerve distributions.  Palpable radial pulse.    Left Upper Extremity:  No abrasions, lacerations, wounds.  No swelling.  No erythema.  Patient is able to make a fist and extend all of his fingers.  Patient is tender over the middle finger A1 pulley.  No tenderness over the A1 pulleys of the other 4 fingers.  There is a palpable click at the level of the middle finger A1 pulley with range of motion.  There is no triggering with range of motion of the other 4 fingers.  Patient has full active motion at the middle finger MCP, PIP, and DIPJ joints.  Sensation is intact in the median, radial, and ulnar nerve distributions.  Palpable radial pulse.      Imaging  Bilateral hand x-rays three views were obtained today and independently reviewed by myself.  Mild arthritic changes are noted at the thumb CMC joints bilaterally.  No fracture.  No dislocation or subluxation.  No foreign body.    Assessment and Plan  79-year-old right-hand dominant male presents with bilateral middle trigger fingers.  Left is more symptomatic than right.  It was discussed the natural history of trigger finger with the patient.  I provided him with a handout to read further on this pathology.  I have recommended bilateral middle finger trigger steroid injections today to see if this will improve or resolve his symptoms.  Patient agreed to proceed.  He tolerated procedure well.  See procedure note.  Discussed that he should give the injection 2 weeks to work.  Follow up in clinic as needed if symptoms recur  or do not resolve.    Laura Ross MD  Orthopaedic Hand Surgery

## 2024-07-22 ENCOUNTER — PATIENT MESSAGE (OUTPATIENT)
Dept: ORTHOPEDICS | Facility: CLINIC | Age: 79
End: 2024-07-22
Payer: MEDICARE

## 2024-08-13 ENCOUNTER — PATIENT MESSAGE (OUTPATIENT)
Dept: ADMINISTRATIVE | Facility: OTHER | Age: 79
End: 2024-08-13
Payer: MEDICARE

## 2024-11-04 ENCOUNTER — IMMUNIZATION (OUTPATIENT)
Dept: INTERNAL MEDICINE | Facility: CLINIC | Age: 79
End: 2024-11-04
Payer: MEDICARE

## 2024-11-04 DIAGNOSIS — Z23 NEED FOR VACCINATION: Primary | ICD-10-CM

## 2024-11-04 PROCEDURE — 99999PBSHW FLU VACCINE - ADJUVANTED: Mod: PBBFAC,,,

## 2024-11-04 PROCEDURE — 90653 IIV ADJUVANT VACCINE IM: CPT | Mod: PBBFAC

## 2024-12-12 ENCOUNTER — LAB VISIT (OUTPATIENT)
Dept: LAB | Facility: HOSPITAL | Age: 79
End: 2024-12-12
Attending: FAMILY MEDICINE
Payer: MEDICARE

## 2024-12-12 DIAGNOSIS — I10 PRIMARY HYPERTENSION: ICD-10-CM

## 2024-12-12 LAB
ALBUMIN SERPL BCP-MCNC: 3.7 G/DL (ref 3.5–5.2)
ALP SERPL-CCNC: 63 U/L (ref 40–150)
ALT SERPL W/O P-5'-P-CCNC: 19 U/L (ref 10–44)
ANION GAP SERPL CALC-SCNC: 6 MMOL/L (ref 8–16)
AST SERPL-CCNC: 21 U/L (ref 10–40)
BILIRUB SERPL-MCNC: 0.6 MG/DL (ref 0.1–1)
BUN SERPL-MCNC: 14 MG/DL (ref 8–23)
CALCIUM SERPL-MCNC: 9.5 MG/DL (ref 8.7–10.5)
CHLORIDE SERPL-SCNC: 108 MMOL/L (ref 95–110)
CHOLEST SERPL-MCNC: 208 MG/DL (ref 120–199)
CHOLEST/HDLC SERPL: 2.7 {RATIO} (ref 2–5)
CO2 SERPL-SCNC: 27 MMOL/L (ref 23–29)
CREAT SERPL-MCNC: 0.9 MG/DL (ref 0.5–1.4)
EST. GFR  (NO RACE VARIABLE): >60 ML/MIN/1.73 M^2
GLUCOSE SERPL-MCNC: 96 MG/DL (ref 70–110)
HDLC SERPL-MCNC: 76 MG/DL (ref 40–75)
HDLC SERPL: 36.5 % (ref 20–50)
LDLC SERPL CALC-MCNC: 119.6 MG/DL (ref 63–159)
NONHDLC SERPL-MCNC: 132 MG/DL
POTASSIUM SERPL-SCNC: 4.4 MMOL/L (ref 3.5–5.1)
PROT SERPL-MCNC: 6.7 G/DL (ref 6–8.4)
SODIUM SERPL-SCNC: 141 MMOL/L (ref 136–145)
TRIGL SERPL-MCNC: 62 MG/DL (ref 30–150)

## 2024-12-12 PROCEDURE — 80053 COMPREHEN METABOLIC PANEL: CPT | Performed by: FAMILY MEDICINE

## 2024-12-12 PROCEDURE — 80061 LIPID PANEL: CPT | Performed by: FAMILY MEDICINE

## 2024-12-12 PROCEDURE — 36415 COLL VENOUS BLD VENIPUNCTURE: CPT | Performed by: FAMILY MEDICINE

## 2024-12-19 ENCOUNTER — OFFICE VISIT (OUTPATIENT)
Dept: INTERNAL MEDICINE | Facility: CLINIC | Age: 79
End: 2024-12-19
Payer: MEDICARE

## 2024-12-19 VITALS
HEIGHT: 68 IN | WEIGHT: 156.5 LBS | HEART RATE: 75 BPM | BODY MASS INDEX: 23.72 KG/M2 | DIASTOLIC BLOOD PRESSURE: 64 MMHG | TEMPERATURE: 96 F | OXYGEN SATURATION: 97 % | SYSTOLIC BLOOD PRESSURE: 132 MMHG

## 2024-12-19 DIAGNOSIS — N40.0 BENIGN PROSTATIC HYPERPLASIA WITHOUT LOWER URINARY TRACT SYMPTOMS: ICD-10-CM

## 2024-12-19 DIAGNOSIS — I10 PRIMARY HYPERTENSION: Primary | ICD-10-CM

## 2024-12-19 DIAGNOSIS — I70.0 AORTIC ATHEROSCLEROSIS: ICD-10-CM

## 2024-12-19 PROCEDURE — 99999 PR PBB SHADOW E&M-EST. PATIENT-LVL III: CPT | Mod: PBBFAC,,, | Performed by: FAMILY MEDICINE

## 2024-12-19 PROCEDURE — G2211 COMPLEX E/M VISIT ADD ON: HCPCS | Mod: S$PBB,,, | Performed by: FAMILY MEDICINE

## 2024-12-19 PROCEDURE — 99213 OFFICE O/P EST LOW 20 MIN: CPT | Mod: PBBFAC | Performed by: FAMILY MEDICINE

## 2024-12-19 PROCEDURE — 99214 OFFICE O/P EST MOD 30 MIN: CPT | Mod: S$PBB,,, | Performed by: FAMILY MEDICINE

## 2024-12-19 NOTE — PROGRESS NOTES
Subjective:       Patient ID: Trip Gomes is a . male.    Chief Complaint: Multiple issues see below      HPI htn dxd years ago in [place] and was on lisin. 5mg and eventually able to d/c. Runs occas higher in clinic. Digital med bps very nl. Exercises ;aerob and wts;5 d/weeks  evens statin well a;hx atheroscl aorta    Bph no c/o;still 1x noct      Hx melanoma utd derm dr barrett      AAlcohol use 1-2 beers per day or l;ess. Less than in past/o    Last years1/24 abd pain resolved w dry January . Gb sldge saw surg but since pain gone no need surg no    hx kidney stone/urg care. Passed stone. 3rd in couple decades. hx urol     Chr toe numbness still tips all 10 toes                Past Medical History:   Diagnosis Date    Arthritis     Cervical disc displacement     Glaucoma     Hyperlipidemia     Hypertension     Kidney stones 11/05/2019    Melanoma of left upper arm 2012    Dr. Wyatt;now dr barrett    White coat syndrome with hypertension      Past Surgical History:   Procedure Laterality Date    BASAL CELL CARCINOMA EXCISION      Right anterior tibia    COLONOSCOPY N/A 1/12/2021    Procedure: COLONOSCOPY;  Surgeon: Deloris Sosa MD;  Location: [facility];  Service: Endoscopy;  Laterality: N/A;    FOOT SURGERY      Right 1st toe joint replacement    JOINT REPLACEMENT Right     R Hip, R big toe    KNEE ARTHROSCOPY W/ DEBRIDEMENT      Bilateral    LIPOMA RESECTION Left 06/03/2018    Dr. Pemberton at [facility]     Melanoma      Left upper arm    TOTAL HIP ARTHROPLASTY Right 11/10/2015     Family History   Problem Relation Name Age of Onset    Stroke Mother Arin 67    Kidney cancer Father       Social History     Socioeconomic History    Marital status:      Spouse name: Tracy    Number of children: 1   Occupational History    Occupation:      Comment: Retired   Tobacco Use    Smoking status: Never    Smokeless tobacco: Never   Substance and Sexual Activity    Alcohol use: Yes      Alcohol/week: 21.0 standard drinks of alcohol     Types: 21 Cans of beer per week     Comment: 2-3 cans/day    Drug use: No    Sexual activity: Yes     Partners: Female     Birth control/protection: None   Social History Narrative    . 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      Social Drivers of Health     Financial Resource Strain: Low Risk  (12/5/2023)    Overall Financial Resource Strain (CARDIA)     Difficulty of Paying Living Expenses: Not hard at all   Food Insecurity: No Food Insecurity (12/5/2023)    Hunger Vital Sign     Worried About Running Out of Food in the Last Year: Never true     Ran Out of Food in the Last Year: Never true   Transportation Needs: No Transportation Needs (12/5/2023)    PRAPARE - Transportation     Lack of Transportation (Medical): No     Lack of Transportation (Non-Medical): No   Physical Activity: Sufficiently Active (12/5/2023)    Exercise Vital Sign     Days of Exercise per Week: 5 days     Minutes of Exercise per Session: 60 min   Stress: No Stress Concern Present (12/5/2023)    [employer] - Occupational Stress Questionnaire     Feeling of Stress : Only a little   Housing Stability: Low Risk  (12/5/2023)    Housing Stability Vital Sign     Unable to Pay for Housing in the Last Year: No     Number of Places Lived in the Last Year: 1     Unstable Housing in the Last Year: No             Review of Systems    Cardiovascular: no chest pain  Chest: no shortness of breath  Abd: no abd pain  Remainder review of systems negative    Objective:      Physical Exam  Vitals signs and nursing note reviewed.   Constitutional:       Appearance: He is well-developed.   HENT:      Head: Normocephalic and atraumatic.     Ac clear     Right Ear: External ear normal.      Left Ear: External ear normal.   Eyes:      General: No scleral icterus.     Conjunctiva/sclera: Conjunctivae normal.      Pupils: Pupils are equal,  round, and reactive to light.   Neck:      Musculoskeletal: Normal range of motion and neck supple.      Vascular: No carotid bruit.   Cardiovascular:      Rate and Rhythm: Normal rate and regular rhythm.      Heart sounds: Normal heart sounds. No murmur. No friction rub. No gallop.    Pulmonary:      Effort: Pulmonary effort is normal.      Breath sounds: Normal breath sounds. No wheezing.   Abdominal:      General: Bowel sounds are normal. There is no distension.      Palpations: Abdomen is soft. There is no mass.      Tenderness: There is no abdominal tenderness. There is no guarding or rebound.   Musculoskeletal: Normal range of motion.         General: No tenderness.   Lymphadenopathy:      Cervical: No cervical adenopathy.   Skin:     General: Skin is warm and dry.      Findings: No erythema or rash.   Neurological:      Mental Status: He is alert and oriented to person, place, and time.      Cranial Nerves: No cranial nerve deficit.      Coordination: Coordination normal.   Psychiatric:         Behavior: Behavior normal.         Thought Content: Thought content normal.         Judgment: Judgment normal.         Assessment:      1. Aortic atherosclerosis    2. bph   3. Essential hypertension     hyperchol         Numbness toes-chr  Plan:         Cont exerc  Dec etoh from 2 servings s /day. May help w toe numbness; also this and hydration may dec bili; plan smiley cmp two months    Lab 12 months and follow up after  Primary hypertension  -     Comprehensive Metabolic Panel; Future; Expected date: 12/17/2024  -     Lipid Panel; Future; Expected date: 12/17/2024    Hypercholesteremia    Benign prostatic hyperplasia without lower urinary tract symptoms    Aortic atherosclerosis    Elevated bilirubin  -     Comprehensive Metabolic Panel; Future; Expected date: 02/19/2024     s

## 2025-02-09 ENCOUNTER — PATIENT MESSAGE (OUTPATIENT)
Dept: ADMINISTRATIVE | Facility: OTHER | Age: 80
End: 2025-02-09
Payer: MEDICARE

## 2025-02-22 DIAGNOSIS — Z00.00 ENCOUNTER FOR MEDICARE ANNUAL WELLNESS EXAM: ICD-10-CM

## 2025-02-28 RX ORDER — PRAVASTATIN SODIUM 40 MG/1
40 TABLET ORAL
Qty: 90 TABLET | Refills: 3 | Status: SHIPPED | OUTPATIENT
Start: 2025-02-28

## 2025-02-28 NOTE — TELEPHONE ENCOUNTER
No care due was identified.  [facility] Embedded Care Due Messages. Reference number: [id number].   2/28/2025 3:20:37 AM CST

## 2025-02-28 NOTE — TELEPHONE ENCOUNTER
Refill Decision Note   Trip Mireya  is requesting a refill authorization.  Brief Assessment and Rationale for Refill:  Approve     Medication Therapy Plan:         Comments:     Note composed:9:16 AM 02/28/2025

## 2025-04-11 ENCOUNTER — OFFICE VISIT (OUTPATIENT)
Dept: ORTHOPEDICS | Facility: CLINIC | Age: 80
End: 2025-04-11
Payer: MEDICARE

## 2025-04-11 VITALS — HEIGHT: 68 IN | BODY MASS INDEX: 23.19 KG/M2 | WEIGHT: 153 LBS

## 2025-04-11 DIAGNOSIS — G56.22 CUBITAL TUNNEL SYNDROME ON LEFT: ICD-10-CM

## 2025-04-11 DIAGNOSIS — G56.02 CARPAL TUNNEL SYNDROME OF LEFT WRIST: ICD-10-CM

## 2025-04-11 DIAGNOSIS — R22.31 FINGER MASS, RIGHT: ICD-10-CM

## 2025-04-11 DIAGNOSIS — M65.331 TRIGGER MIDDLE FINGER OF RIGHT HAND: Primary | ICD-10-CM

## 2025-04-11 PROCEDURE — 99999 PR PBB SHADOW E&M-EST. PATIENT-LVL III: CPT | Mod: PBBFAC,,, | Performed by: STUDENT IN AN ORGANIZED HEALTH CARE EDUCATION/TRAINING PROGRAM

## 2025-04-11 PROCEDURE — 99213 OFFICE O/P EST LOW 20 MIN: CPT | Mod: PBBFAC | Performed by: STUDENT IN AN ORGANIZED HEALTH CARE EDUCATION/TRAINING PROGRAM

## 2025-04-11 RX ORDER — BETAMETHASONE SODIUM PHOSPHATE AND BETAMETHASONE ACETATE 3; 3 MG/ML; MG/ML
6 INJECTION, SUSPENSION INTRA-ARTICULAR; INTRALESIONAL; INTRAMUSCULAR; SOFT TISSUE
Status: DISCONTINUED | OUTPATIENT
Start: 2025-04-11 | End: 2025-04-11 | Stop reason: HOSPADM

## 2025-04-11 RX ADMIN — BETAMETHASONE SODIUM PHOSPHATE AND BETAMETHASONE ACETATE 6 MG: 3; 3 INJECTION, SUSPENSION INTRA-ARTICULAR; INTRALESIONAL; INTRAMUSCULAR; SOFT TISSUE at 09:04

## 2025-04-11 NOTE — PROCEDURES
Right middle trigger finger injection    Date/Time: 4/11/2025 9:20 AM    Performed by: Laura Ross MD  Authorized by: Laura Ross MD    Consent Done?:  Yes (Verbal)  Indications:  Pain  Timeout: prior to procedure the correct patient, procedure, and site was verified    Local anesthesia used?: Yes    Anesthesia:  Local infiltration  Local anesthetic:  Lidocaine 1% without epinephrine  Anesthetic total (ml):  1    Location:  Long finger  Site:  R long flexor tendon sheath  Ultrasonic guidance for needle placement?: No    Needle size:  27 G  Approach:  Volar  Medications:  6 mg betamethasone acetate-betamethasone sodium phosphate 6 mg/mL  Patient tolerance:  Patient tolerated the procedure well with no immediate complications

## 2025-04-11 NOTE — PROGRESS NOTES
Hand Surgery Clinic Follow Up Note    Chief Complaint  Chief Complaint   Patient presents with    Right Hand - Pain, Swelling     Middle finger        History of Present Illness  79-year-old right-hand dominant male who is retired presents for evaluation of 3 issues today.      First, patient is here to address catching and locking symptoms of the right middle finger.  He was seen in June of 2024, 10 months ago, and diagnosed with bilateral middle trigger finger for which he underwent injections.  He has not had any issues with the left middle finger since this injection.  The catching and locking symptoms in the right middle finger returned around 1 month ago.  Patient notes associated 5/10 pain.  He also notes decreased  strength as well as some radiating pain on the dorsal aspect of the finger.  He describes the pain as throbbing in nature.    Second, patient has also noted a mass at the radial aspect of the index finger PIPJ joint.  Mass has been present for several months.  It is not bothersome with daily activities.  It is not tender.      Third, patient also notes that he will sometimes have numbness in the left hand at night.  Sometimes he notes this when he 1st wakes up in the morning.  There is not much associated pain.  He has learned to adjust his positioning so that the numbness resolves on its own.  He does not have numbness during the daytime.  He does not have neck pain.    Review of Systems  Review of systems negative for chest pain, shortness of breath, fevers, chills, nausea/vomiting.    Vital Signs  There were no vitals filed for this visit.    Physical Exam  Constitutional: Appears well-developed and well-nourished. No distress.   HENT:   Head: Normocephalic.   Eyes: EOM are normal.   Pulmonary/Chest: Effort normal.   Neurological: Oriented to person, place, and time.   Psychiatric: Normal mood and affect.     Right Upper Extremity:  No abrasions, lacerations, wounds.  No swelling.  No  erythema.  Patient is able to make a fist and extend all of his fingers.  Patient is tender over the middle finger A1 pulley.  No tenderness over the A1 pulleys of the other 4 fingers.  There is a palpable click at the level of the middle finger A1 pulley with range of motion.  There is no triggering with range of motion of the other 4 fingers.  Patient has full active motion at the middle finger MCP, PIP, and DIPJ joints.  A 1 x 1 cm round mobile mass is noted at the radial aspect of the index finger at the level of the PIPJ joint.  It is not tender.  Sensation is intact in the median, radial, and ulnar nerve distributions.  Palpable radial pulse.     Left Upper Extremity:  No abrasions, lacerations, wounds.  No swelling.  No erythema.  Full active elbow flexion and extension. Full active wrist flexion and extension.  Full pronation and supination.  Positive Tinel's in the median nerve distribution at the wrist.  Positive Phalen's.  Positive Tinel's in the ulnar nerve distribution at the elbow.  No ulnar nerve subluxation with elbow flexion.  Positive elbow flexion test. No thenar or FDI atrophy.  5/5 apb strength.  5/5 FDI strength.  Two-point discrimination is 5 mm in all 5 fingers.  Patient is able to make a fist and extend all her fingers.  No tenderness over the A1 pulleys of all 5 fingers.  No triggering with range of motion.  Negative froments. Negative Wartenbergs. Palpable radial pulse.      Imaging  No new imaging obtained today.    Assessment and Plan  71-year-old male presents for follow up.  We discussed 4 separate issues today.    Right middle trigger finger  I discussed the natural history of trigger finger with the patient.  I discussed treatment options including both nonoperative and operative treatment.  At this time, patient elected to proceed with repeat injection in the right middle finger at the level of the A1 pulley.  He tolerated procedure well.  See procedure note.  Discussed that he  should give the injection 2 weeks to work.  Right index finger mass  I discussed that this mass is likely either a ganglion cyst associated with the PIPJ joint versus a giant cell tumor of tendon sheath.  I discussed treatment options.  Given that it is not tender, there is no associated pain, and it has not changed in size, we elected to observe for now.  Left carpal tunnel syndrome and cubital tunnel syndrome  Patient does not have pain from this at this time.  He notes numbness at night and when he wakes up in the morning.  I discussed the natural history of carpal and cubital tunnel syndrome with the patient.  I provided him with a handout to read further on this pathology.  After discussion of treatment options, we elected to begin with a carpal tunnel brace to be worn during nighttime only. At least 10 minutes were spent sizing, fitting, and educating for durable medical equipment application today.  This service was performed under the direction of Laura Ross MD.  CPT 93501.    Follow up in clinic as needed if symptoms recur or do not resolve.    Laura Ross MD  Orthopaedic Hand Surgery

## 2025-06-07 ENCOUNTER — PATIENT MESSAGE (OUTPATIENT)
Dept: INTERNAL MEDICINE | Facility: CLINIC | Age: 80
End: 2025-06-07
Payer: MEDICARE

## 2025-07-24 PROBLEM — H40.1131 PRIMARY OPEN ANGLE GLAUCOMA (POAG) OF BOTH EYES, MILD STAGE: Status: ACTIVE | Noted: 2025-07-24

## 2025-07-24 PROBLEM — H02.88B MEIBOMIAN GLAND DYSFUNCTION (MGD) OF UPPER AND LOWER LIDS OF BOTH EYES: Status: ACTIVE | Noted: 2025-07-24

## 2025-07-24 PROBLEM — H02.88A MEIBOMIAN GLAND DYSFUNCTION (MGD) OF UPPER AND LOWER LIDS OF BOTH EYES: Status: ACTIVE | Noted: 2025-07-24

## 2025-07-24 PROBLEM — H25.13 NUCLEAR AGE-RELATED CATARACT, BOTH EYES: Status: ACTIVE | Noted: 2025-07-24

## 2025-08-07 ENCOUNTER — OFFICE VISIT (OUTPATIENT)
Dept: ORTHOPEDICS | Facility: CLINIC | Age: 80
End: 2025-08-07
Payer: MEDICARE

## 2025-08-07 VITALS — WEIGHT: 153 LBS | HEIGHT: 68 IN | BODY MASS INDEX: 23.19 KG/M2

## 2025-08-07 DIAGNOSIS — M65.331 TRIGGER MIDDLE FINGER OF RIGHT HAND: Primary | ICD-10-CM

## 2025-08-07 DIAGNOSIS — M65.331 TRIGGER FINGER, RIGHT MIDDLE FINGER: ICD-10-CM

## 2025-08-07 DIAGNOSIS — M65.332 TRIGGER MIDDLE FINGER OF LEFT HAND: ICD-10-CM

## 2025-08-07 PROCEDURE — 99214 OFFICE O/P EST MOD 30 MIN: CPT | Mod: PBBFAC | Performed by: STUDENT IN AN ORGANIZED HEALTH CARE EDUCATION/TRAINING PROGRAM

## 2025-08-07 PROCEDURE — 99999 PR PBB SHADOW E&M-EST. PATIENT-LVL IV: CPT | Mod: PBBFAC,,, | Performed by: STUDENT IN AN ORGANIZED HEALTH CARE EDUCATION/TRAINING PROGRAM

## 2025-08-07 PROCEDURE — 99215 OFFICE O/P EST HI 40 MIN: CPT | Mod: S$PBB,,, | Performed by: STUDENT IN AN ORGANIZED HEALTH CARE EDUCATION/TRAINING PROGRAM

## 2025-08-07 RX ORDER — SODIUM CHLORIDE 9 MG/ML
INJECTION, SOLUTION INTRAVENOUS CONTINUOUS
OUTPATIENT
Start: 2025-08-07

## 2025-08-07 RX ORDER — LIDOCAINE HYDROCHLORIDE 10 MG/ML
1 INJECTION, SOLUTION EPIDURAL; INFILTRATION; INTRACAUDAL; PERINEURAL ONCE
OUTPATIENT
Start: 2025-08-07 | End: 2025-08-07

## 2025-08-07 NOTE — PROGRESS NOTES
Hand Surgery Clinic Note    Chief Complaint  Chief Complaint   Patient presents with    Right Hand - Pain    Left Hand - Pain       History of Present Illness  80-year-old right-hand dominant male who is a retired  presents for follow up evaluation.  He has previously been diagnosed with bilateral middle trigger finger, right more symptomatic than left.  For the right middle finger, he has a undergone 2 previous steroid injections the most recent of which was 04/11/2025.  He experienced recurrence within the last 1-2 months.  Every day, the finger will get stuck down and he will have to pull it straight.  He is also starting to experience recurrence of the left middle trigger finger.  Most recent injection for this was in June of 2024.  He has been numbness or tingling.  He does not note some stiffness in the right middle finger as well.  Pain level is a 7/10.    Review of Systems  Review of systems negative for chest pain, shortness of breath, fevers, chills, nausea/vomiting.    Past Medical History  Past Medical History:   Diagnosis Date    Arthritis     Cervical disc displacement     Glaucoma     Hyperlipidemia     Hypertension     Kidney stones 11/05/2019    Melanoma of left upper arm 2012    Dr. Wyatt;now dr barrett    White coat syndrome with hypertension        Past Surgical History  Past Surgical History:   Procedure Laterality Date    BASAL CELL CARCINOMA EXCISION      Right anterior tibia    COLONOSCOPY N/A 1/12/2021    Procedure: COLONOSCOPY;  Surgeon: Deloris Sosa MD;  Location: [facility];  Service: Endoscopy;  Laterality: N/A;    FOOT SURGERY      Right 1st toe joint replacement    JOINT REPLACEMENT Right     R Hip, R big toe    KNEE ARTHROSCOPY W/ DEBRIDEMENT      Bilateral    LIPOMA RESECTION Left 06/03/2018    Dr. Pemberton at [facility]     Melanoma      Left upper arm    TOTAL HIP ARTHROPLASTY Right 11/10/2015       Allergies  Review of patient's allergies indicates:  No Known  Allergies    Family History  Family History   Problem Relation Name Age of Onset    Stroke Mother Arin Pillai    Kidney cancer Father         Social History  Social History[1]    Vital Signs  There were no vitals filed for this visit.    Physical Exam  Constitutional: Appears well-developed and well-nourished. No distress.   HENT:   Head: Normocephalic.   Eyes: EOM are normal.   Pulmonary/Chest: Effort normal.   Neurological: Oriented to person, place, and time.   Psychiatric: Normal mood and affect.     Right Upper Extremity:  No abrasions, lacerations, wounds.  No swelling.  No erythema.  Patient is able to make a fist and extend all of his fingers.  Patient is tender over the middle finger A1 pulley.  No tenderness over the A1 pulleys of the other 4 fingers.  There is a palpable click at the level of the middle finger A1 pulley with range of motion.  There is no triggering with range of motion of the other 4 fingers.  Patient has full active motion at the middle finger MCP and DIPJ joints.  Patient has a 5 degree contracture of the right middle finger PIPJ joint, full active flexion.  Sensation is intact in the median, radial, and ulnar nerve distributions.  Palpable radial pulse.    Left Upper Extremity:  No abrasions, lacerations, wounds.  No swelling.  No erythema.  Patient is able to make a fist and extend all of his fingers.  Patient is tender over the middle finger A1 pulley.  No tenderness over the A1 pulleys of the other 4 fingers.  There is a palpable click at the level of the middle finger A1 pulley with range of motion.  There is no triggering with range of motion of the other 4 fingers.  Patient has full active motion at the middle finger MCP, PIP, and DIPJ joints.  Sensation is intact in the median, radial, and ulnar nerve distributions.  Palpable radial pulse.      Imaging  No new imaging obtained today.    Assessment and Plan  80-year-old male presents for follow up.  He has bilateral middle trigger  finger, right more painful and symptomatic than left.  He has had 2 previous steroid injections.  Most recent injections were 04/11/2025.  He experienced recurrence within the last month.  Risks and benefits of operative versus nonoperative treatment were discussed with the patient.  Risks of trigger finger release surgery were discussed. In particular, the recovery from trigger finger surgery was noted to be possibly unpredictable.  It was noted that some patients may need occupational therapy and that sometimes stiffness and/or tenderness of the operative finger persists for many months.  Due to the existence of chronic intermittent flexor tendinitis prior to the surgery, it was explained that immediate return of full motion and all resolution of pain may take a long time.  Sometimes the triggering itself may persist and need another steroid injection after the surgery.  The patient understands that normal structures in the field of surgery that can be damaged by the surgery (and require secondary surgery) include the digital nerves and flexor tendons.    Patient elected to proceed with right middle trigger finger release.  Anesthetic options were discussed and patient elected to proceed with local only.  Consent was a pain for right middle trigger finger release.  Surgery was scheduled for 08/14/2025.  We will plan to address the left side in the future following right hand surgery.    Laura Ross MD  Orthopaedic Hand Surgery         [1]   Social History  Socioeconomic History    Marital status:      Spouse name: Tracy    Number of children: 1   Occupational History    Occupation:      Comment: Retired   Tobacco Use    Smoking status: Never    Smokeless tobacco: Never   Substance and Sexual Activity    Alcohol use: Not Currently     Comment: 1 beer/day as of late 2024    Drug use: No    Sexual activity: Yes     Partners: Female     Birth control/protection: None   Social History  Narrative    . 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      Social Drivers of Health     Financial Resource Strain: Low Risk  (12/5/2023)    Overall Financial Resource Strain (CARDIA)     Difficulty of Paying Living Expenses: Not hard at all   Food Insecurity: No Food Insecurity (12/5/2023)    Hunger Vital Sign     Worried About Running Out of Food in the Last Year: Never true     Ran Out of Food in the Last Year: Never true   Transportation Needs: No Transportation Needs (12/5/2023)    PRAPARE - Transportation     Lack of Transportation (Medical): No     Lack of Transportation (Non-Medical): No   Physical Activity: Sufficiently Active (12/5/2023)    Exercise Vital Sign     Days of Exercise per Week: 5 days     Minutes of Exercise per Session: 60 min   Stress: No Stress Concern Present (12/5/2023)    [employer] - Occupational Stress Questionnaire     Feeling of Stress : Only a little   Housing Stability: Low Risk  (12/5/2023)    Housing Stability Vital Sign     Unable to Pay for Housing in the Last Year: No     Number of Places Lived in the Last Year: 1     Unstable Housing in the Last Year: No

## 2025-08-08 ENCOUNTER — PATIENT MESSAGE (OUTPATIENT)
Dept: ADMINISTRATIVE | Facility: OTHER | Age: 80
End: 2025-08-08
Payer: MEDICARE

## 2025-08-11 ENCOUNTER — TELEPHONE (OUTPATIENT)
Dept: PREADMISSION TESTING | Facility: HOSPITAL | Age: 80
End: 2025-08-11
Payer: MEDICARE

## 2025-08-13 ENCOUNTER — PATIENT MESSAGE (OUTPATIENT)
Dept: RESPIRATORY THERAPY | Facility: HOSPITAL | Age: 80
End: 2025-08-13
Payer: MEDICARE

## 2025-08-14 ENCOUNTER — HOSPITAL ENCOUNTER (OUTPATIENT)
Facility: HOSPITAL | Age: 80
Discharge: HOME | End: 2025-08-14
Attending: STUDENT IN AN ORGANIZED HEALTH CARE EDUCATION/TRAINING PROGRAM | Admitting: STUDENT IN AN ORGANIZED HEALTH CARE EDUCATION/TRAINING PROGRAM
Payer: MEDICARE

## 2025-08-14 VITALS
OXYGEN SATURATION: 98 % | RESPIRATION RATE: 18 BRPM | BODY MASS INDEX: 23.11 KG/M2 | WEIGHT: 156 LBS | SYSTOLIC BLOOD PRESSURE: 160 MMHG | HEART RATE: 80 BPM | TEMPERATURE: 98 F | HEIGHT: 69 IN | DIASTOLIC BLOOD PRESSURE: 84 MMHG

## 2025-08-14 DIAGNOSIS — Z98.890 POSTOPERATIVE STATE: Primary | ICD-10-CM

## 2025-08-14 DIAGNOSIS — M65.331 TRIGGER MIDDLE FINGER OF RIGHT HAND: ICD-10-CM

## 2025-08-14 DIAGNOSIS — M65.331 TRIGGER FINGER, RIGHT MIDDLE FINGER: ICD-10-CM

## 2025-08-14 PROCEDURE — 36000707: Performed by: STUDENT IN AN ORGANIZED HEALTH CARE EDUCATION/TRAINING PROGRAM

## 2025-08-14 PROCEDURE — 36000706: Performed by: STUDENT IN AN ORGANIZED HEALTH CARE EDUCATION/TRAINING PROGRAM

## 2025-08-14 PROCEDURE — 26055 INCISE FINGER TENDON SHEATH: CPT | Mod: F7,,, | Performed by: STUDENT IN AN ORGANIZED HEALTH CARE EDUCATION/TRAINING PROGRAM

## 2025-08-14 PROCEDURE — 63600175 PHARM REV CODE 636 W HCPCS: Performed by: STUDENT IN AN ORGANIZED HEALTH CARE EDUCATION/TRAINING PROGRAM

## 2025-08-14 RX ORDER — BACITRACIN ZINC 500 [USP'U]/G
OINTMENT TOPICAL
Status: DISCONTINUED
Start: 2025-08-14 | End: 2025-08-14 | Stop reason: HOSPADM

## 2025-08-14 RX ORDER — BUPIVACAINE HYDROCHLORIDE 2.5 MG/ML
INJECTION, SOLUTION EPIDURAL; INFILTRATION; INTRACAUDAL; PERINEURAL
Status: DISCONTINUED | OUTPATIENT
Start: 2025-08-14 | End: 2025-08-14 | Stop reason: HOSPADM

## 2025-08-14 RX ORDER — BUPIVACAINE HYDROCHLORIDE 2.5 MG/ML
INJECTION, SOLUTION EPIDURAL; INFILTRATION; INTRACAUDAL; PERINEURAL
Status: DISCONTINUED
Start: 2025-08-14 | End: 2025-08-14 | Stop reason: HOSPADM

## 2025-08-14 RX ORDER — LIDOCAINE HYDROCHLORIDE 10 MG/ML
INJECTION, SOLUTION EPIDURAL; INFILTRATION; INTRACAUDAL; PERINEURAL
Status: DISCONTINUED | OUTPATIENT
Start: 2025-08-14 | End: 2025-08-14 | Stop reason: HOSPADM

## 2025-08-14 RX ORDER — SODIUM CHLORIDE 9 MG/ML
INJECTION, SOLUTION INTRAVENOUS CONTINUOUS
Status: DISCONTINUED | OUTPATIENT
Start: 2025-08-14 | End: 2025-08-14 | Stop reason: HOSPADM

## 2025-08-14 RX ORDER — ACETAMINOPHEN 500 MG
500 TABLET ORAL EVERY 8 HOURS PRN
Qty: 30 TABLET | Refills: 0 | Status: SHIPPED | OUTPATIENT
Start: 2025-08-14

## 2025-08-14 RX ORDER — LIDOCAINE HYDROCHLORIDE 10 MG/ML
1 INJECTION, SOLUTION EPIDURAL; INFILTRATION; INTRACAUDAL; PERINEURAL ONCE
Status: DISCONTINUED | OUTPATIENT
Start: 2025-08-14 | End: 2025-08-14 | Stop reason: HOSPADM

## 2025-08-14 RX ORDER — LIDOCAINE HYDROCHLORIDE 10 MG/ML
INJECTION, SOLUTION EPIDURAL; INFILTRATION; INTRACAUDAL; PERINEURAL
Status: DISCONTINUED
Start: 2025-08-14 | End: 2025-08-14 | Stop reason: HOSPADM

## 2025-08-14 RX ORDER — NAPROXEN 500 MG/1
500 TABLET ORAL EVERY 8 HOURS PRN
Qty: 30 TABLET | Refills: 0 | Status: SHIPPED | OUTPATIENT
Start: 2025-08-14

## 2025-08-14 RX ORDER — TRAMADOL HYDROCHLORIDE 50 MG/1
50 TABLET, FILM COATED ORAL EVERY 8 HOURS PRN
Qty: 5 EACH | Refills: 0 | Status: SHIPPED | OUTPATIENT
Start: 2025-08-14

## 2025-08-28 ENCOUNTER — OFFICE VISIT (OUTPATIENT)
Dept: ORTHOPEDICS | Facility: CLINIC | Age: 80
End: 2025-08-28
Payer: MEDICARE

## 2025-08-28 VITALS — HEIGHT: 69 IN | BODY MASS INDEX: 22.66 KG/M2 | WEIGHT: 153 LBS

## 2025-08-28 DIAGNOSIS — Z98.890 POSTOPERATIVE STATE: ICD-10-CM

## 2025-08-28 DIAGNOSIS — M65.332 TRIGGER MIDDLE FINGER OF LEFT HAND: Primary | ICD-10-CM

## 2025-08-28 PROCEDURE — 20550 NJX 1 TENDON SHEATH/LIGAMENT: CPT | Mod: PBBFAC,LT | Performed by: STUDENT IN AN ORGANIZED HEALTH CARE EDUCATION/TRAINING PROGRAM

## 2025-08-28 PROCEDURE — 99999 PR PBB SHADOW E&M-EST. PATIENT-LVL III: CPT | Mod: PBBFAC,,, | Performed by: STUDENT IN AN ORGANIZED HEALTH CARE EDUCATION/TRAINING PROGRAM

## 2025-08-28 PROCEDURE — 99999PBSHW PR PBB SHADOW TECHNICAL ONLY FILED TO HB: Mod: PBBFAC,,,

## 2025-08-28 PROCEDURE — 99213 OFFICE O/P EST LOW 20 MIN: CPT | Mod: PBBFAC | Performed by: STUDENT IN AN ORGANIZED HEALTH CARE EDUCATION/TRAINING PROGRAM

## 2025-08-28 RX ORDER — BETAMETHASONE SODIUM PHOSPHATE AND BETAMETHASONE ACETATE 3; 3 MG/ML; MG/ML
6 INJECTION, SUSPENSION INTRA-ARTICULAR; INTRALESIONAL; INTRAMUSCULAR; SOFT TISSUE
Status: DISCONTINUED | OUTPATIENT
Start: 2025-08-28 | End: 2025-08-28 | Stop reason: HOSPADM

## 2025-08-28 RX ADMIN — BETAMETHASONE ACETATE AND BETAMETHASONE SODIUM PHOSPHATE 6 MG: 3; 3 INJECTION, SUSPENSION INTRA-ARTICULAR; INTRALESIONAL; INTRAMUSCULAR; SOFT TISSUE at 09:08

## (undated) DEVICE — ALCOHOL 70% ANTISEPTIC ISO 4OZ

## (undated) DEVICE — NDL SAFETY 22G X 1.5 ECLIPSE

## (undated) DEVICE — Device

## (undated) DEVICE — DRAPE THREE-QTR REINF 53X77IN

## (undated) DEVICE — DRAPE U SPLIT SHEET 54X76IN

## (undated) DEVICE — DRAPE HAND STERILE

## (undated) DEVICE — CORD CAUTERY BIPOLAR STERILE

## (undated) DEVICE — SCRUB HIBICLENS 4% CHG 4OZ

## (undated) DEVICE — APPLICATOR CHLORAPREP ORN 26ML

## (undated) DEVICE — GOWN FAB REINF SET-IN SLV LG

## (undated) DEVICE — POSITIONER HEAD DONUT 9IN FOAM

## (undated) DEVICE — COVER LIGHT HANDLE 80/CA

## (undated) DEVICE — SUPPORT ULNA NERVE PROTECTOR

## (undated) DEVICE — PACK BASIC SETUP SC BR

## (undated) DEVICE — COVER CAMERA OPERATING ROOM

## (undated) DEVICE — UNDERGLOVES BIOGEL PI SZ 7 LF

## (undated) DEVICE — GLOVE SURGEONS ULTRA TOUCH 6.5

## (undated) DEVICE — DRAPE STERI-DRAPE 1000 17X11IN

## (undated) DEVICE — BANDAGE ESMARK ELASTIC ST 4X9

## (undated) DEVICE — TOWEL OR DISP STRL BLUE 4/PK

## (undated) DEVICE — KIT TURNOVER

## (undated) DEVICE — DRESSING N ADH OIL EMUL 3X3

## (undated) DEVICE — SOL 9P NACL IRR PIC IL

## (undated) DEVICE — SYR 10CC LUER LOCK

## (undated) DEVICE — BNDG COFLEX FOAM LF2 ST 3X5YD

## (undated) DEVICE — SPONGE GAUZE 4X4 12 PLY STRL

## (undated) DEVICE — SUT 4-0 ETHILON 18 PS-2